# Patient Record
Sex: MALE | Race: BLACK OR AFRICAN AMERICAN | NOT HISPANIC OR LATINO | Employment: OTHER | URBAN - METROPOLITAN AREA
[De-identification: names, ages, dates, MRNs, and addresses within clinical notes are randomized per-mention and may not be internally consistent; named-entity substitution may affect disease eponyms.]

---

## 2017-01-03 RX ORDER — FUROSEMIDE 20 MG/1
TABLET ORAL
Qty: 90 TABLET | Refills: 0 | Status: SHIPPED | OUTPATIENT
Start: 2017-01-03 | End: 2017-02-07 | Stop reason: SDUPTHER

## 2017-01-04 RX ORDER — SPIRONOLACTONE 25 MG/1
TABLET ORAL
Qty: 90 TABLET | Refills: 0 | OUTPATIENT
Start: 2017-01-04

## 2017-01-04 RX ORDER — SPIRONOLACTONE 25 MG/1
TABLET ORAL
Qty: 90 TABLET | Refills: 0 | Status: SHIPPED | OUTPATIENT
Start: 2017-01-04

## 2017-01-04 RX ORDER — CLONIDINE HYDROCHLORIDE 0.2 MG/1
TABLET ORAL
Qty: 270 TABLET | Refills: 0 | Status: ON HOLD | OUTPATIENT
Start: 2017-01-04 | End: 2017-05-12 | Stop reason: HOSPADM

## 2017-01-09 NOTE — TELEPHONE ENCOUNTER
----- Message from Patsy Corrales sent at 1/9/2017  1:06 PM CST -----  Patient is requesting a medication refill.     RX name: LANCETS  Strength:   Quantity:   Directions: patient test himself 2xday    Pharmacy name: [facility]

## 2017-01-10 RX ORDER — LANCETS
EACH MISCELLANEOUS
Qty: 200 EACH | Refills: 3 | Status: ON HOLD | OUTPATIENT
Start: 2017-01-10 | End: 2017-05-12 | Stop reason: HOSPADM

## 2017-02-05 RX ORDER — PREGABALIN 75 MG/1
CAPSULE ORAL
Qty: 90 CAPSULE | Refills: 0 | Status: SHIPPED | OUTPATIENT
Start: 2017-02-05

## 2017-02-07 ENCOUNTER — OFFICE VISIT (OUTPATIENT)
Dept: FAMILY MEDICINE | Facility: CLINIC | Age: 72
End: 2017-02-07
Payer: MEDICARE

## 2017-02-07 VITALS
SYSTOLIC BLOOD PRESSURE: 130 MMHG | OXYGEN SATURATION: 99 % | BODY MASS INDEX: 25.7 KG/M2 | HEIGHT: 69 IN | HEART RATE: 65 BPM | WEIGHT: 173.5 LBS | TEMPERATURE: 98 F | DIASTOLIC BLOOD PRESSURE: 72 MMHG

## 2017-02-07 DIAGNOSIS — N18.4 CKD (CHRONIC KIDNEY DISEASE) STAGE 4, GFR 15-29 ML/MIN: ICD-10-CM

## 2017-02-07 DIAGNOSIS — I25.810 CORONARY ARTERY DISEASE INVOLVING CORONARY BYPASS GRAFT OF NATIVE HEART WITHOUT ANGINA PECTORIS: ICD-10-CM

## 2017-02-07 DIAGNOSIS — E11.42 CONTROLLED TYPE 2 DIABETES MELLITUS WITH DIABETIC POLYNEUROPATHY, WITH LONG-TERM CURRENT USE OF INSULIN: Primary | ICD-10-CM

## 2017-02-07 DIAGNOSIS — E11.21 TYPE 2 DIABETES MELLITUS WITH DIABETIC NEPHROPATHY, WITH LONG-TERM CURRENT USE OF INSULIN: ICD-10-CM

## 2017-02-07 DIAGNOSIS — E11.22 TYPE 2 DIABETES MELLITUS WITH STAGE 4 CHRONIC KIDNEY DISEASE, WITH LONG-TERM CURRENT USE OF INSULIN: ICD-10-CM
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
## 2017-02-07 DIAGNOSIS — Z79.4 CONTROLLED TYPE 2 DIABETES MELLITUS WITH DIABETIC POLYNEUROPATHY, WITH LONG-TERM CURRENT USE OF INSULIN: Primary | ICD-10-CM

## 2017-02-07 DIAGNOSIS — N40.0 BENIGN PROSTATIC HYPERPLASIA, PRESENCE OF LOWER URINARY TRACT SYMPTOMS UNSPECIFIED, UNSPECIFIED MORPHOLOGY: ICD-10-CM

## 2017-02-07 DIAGNOSIS — N18.4 TYPE 2 DIABETES MELLITUS WITH STAGE 4 CHRONIC KIDNEY DISEASE, WITH LONG-TERM CURRENT USE OF INSULIN: ICD-10-CM

## 2017-02-07 DIAGNOSIS — I10 ESSENTIAL HYPERTENSION: ICD-10-CM

## 2017-02-07 DIAGNOSIS — Z79.4 TYPE 2 DIABETES MELLITUS WITH DIABETIC NEPHROPATHY, WITH LONG-TERM CURRENT USE OF INSULIN: ICD-10-CM

## 2017-02-07 DIAGNOSIS — Z79.4 TYPE 2 DIABETES MELLITUS WITH STAGE 4 CHRONIC KIDNEY DISEASE, WITH LONG-TERM CURRENT USE OF INSULIN: ICD-10-CM

## 2017-02-07 DIAGNOSIS — Z91.199 NONCOMPLIANCE: ICD-10-CM

## 2017-02-07 DIAGNOSIS — I73.9 PVD (PERIPHERAL VASCULAR DISEASE): ICD-10-CM

## 2017-02-07 DIAGNOSIS — N19 RENAL FAILURE: ICD-10-CM

## 2017-02-07 DIAGNOSIS — I25.10 CORONARY ARTERY DISEASE INVOLVING NATIVE CORONARY ARTERY WITHOUT ANGINA PECTORIS, UNSPECIFIED WHETHER NATIVE OR TRANSPLANTED HEART: ICD-10-CM

## 2017-02-07 PROCEDURE — 1157F ADVNC CARE PLAN IN RCRD: CPT | Mod: S$GLB,,, | Performed by: FAMILY MEDICINE

## 2017-02-07 PROCEDURE — 2022F DILAT RTA XM EVC RTNOPTHY: CPT | Mod: S$GLB,,, | Performed by: FAMILY MEDICINE

## 2017-02-07 PROCEDURE — 1160F RVW MEDS BY RX/DR IN RCRD: CPT | Mod: S$GLB,,, | Performed by: FAMILY MEDICINE

## 2017-02-07 PROCEDURE — 1159F MED LIST DOCD IN RCRD: CPT | Mod: S$GLB,,, | Performed by: FAMILY MEDICINE

## 2017-02-07 PROCEDURE — 3046F HEMOGLOBIN A1C LEVEL >9.0%: CPT | Mod: S$GLB,,, | Performed by: FAMILY MEDICINE

## 2017-02-07 PROCEDURE — 99213 OFFICE O/P EST LOW 20 MIN: CPT | Mod: S$GLB,,, | Performed by: FAMILY MEDICINE

## 2017-02-07 PROCEDURE — 3060F POS MICROALBUMINURIA REV: CPT | Mod: S$GLB,,, | Performed by: FAMILY MEDICINE

## 2017-02-07 PROCEDURE — 1125F AMNT PAIN NOTED PAIN PRSNT: CPT | Mod: S$GLB,,, | Performed by: FAMILY MEDICINE

## 2017-02-07 PROCEDURE — 99499 UNLISTED E&M SERVICE: CPT | Mod: S$GLB,,, | Performed by: FAMILY MEDICINE

## 2017-02-07 NOTE — MR AVS SNAPSHOT
[facility]  [address] [place] [address]  [place] [address]  Phone: [phone]  Fax: [phone]                  Tommie Salmeron   2017 1:40 PM   Office Visit    Description:  Male : 1945   Provider:  Bridger Deleon MD   Department:  [facility]           Reason for Visit     Follow-up           Diagnoses this Visit        Comments    Controlled type 2 diabetes mellitus with diabetic polyneuropathy, with long-term current use of insulin    -  Primary     Fall, initial encounter         Coronary artery disease involving native coronary artery without angina pectoris, unspecified whether native or transplanted heart         Coronary artery disease involving coronary bypass graft of native heart without angina pectoris         Essential hypertension         Benign prostatic hyperplasia, presence of lower urinary tract symptoms unspecified, unspecified morphology         CKD (chronic kidney disease) stage 4, GFR 15-29 ml/min         Type 2 diabetes mellitus with stage 4 chronic kidney disease, with long-term current use of insulin         Renal failure         Type 2 diabetes mellitus with diabetic nephropathy, with long-term current use of insulin         PVD (peripheral vascular disease)         Noncompliance                To Do List           Goals (5 Years of Data)     None      Follow-Up and Disposition     Return in about 3 months (around 2017).      [facility]     [facility] On Call Nurse Care Line -  Assistance  Registered nurses in the [facility] On Call Center provide clinical advisement, health education, appointment booking, and other advisory services.  Call for this free service at [phone].             Medications           Message regarding Medications     Verify the changes and/or additions to your medication regime listed below are the same as discussed with your clinician today.  If any of these changes or additions are incorrect, please notify  "your healthcare provider.        STOP taking these medications     metoclopramide HCl (REGLAN) 5 MG tablet TAKE ONE TABLET BY MOUTH 4 TIMES DAILY    sodium bicarbonate 650 MG tablet Take 1 tablet (650 mg total) by mouth 3 (three) times daily.    tamsulosin (FLOMAX) 0.4 mg Cp24 Take 1 capsule (0.4 mg total) by mouth once daily.           Verify that the below list of medications is an accurate representation of the medications you are currently taking.  If none reported, the list may be blank. If incorrect, please contact your healthcare provider. Carry this list with you in case of emergency.           Current Medications     aspirin (ECOTRIN) 81 MG EC tablet Take 1 tablet (81 mg total) by mouth once daily.    BLOOD SUGAR DIAGNOSTIC (TRUETEST TEST STRIPS MISC) by Misc.(Non-Drug; Combo Route) route.    cloNIDine (CATAPRES) 0.2 MG tablet TAKE ONE TABLET BY MOUTH THREE TIMES DAILY    escitalopram oxalate (LEXAPRO) 20 MG tablet TAKE ONE TABLET BY MOUTH ONCE DAILY    furosemide (LASIX) 20 MG tablet TAKE ONE TABLET BY MOUTH ONCE DAILY    insulin safety needles, disp, (PRODIGY PEN NEEDLE) 29 x 1/2 " Ndle 1 Units by Misc.(Non-Drug; Combo Route) route 2 (two) times daily. Flex pen needles    lancets Misc USE BID    LYRICA 75 mg capsule TAKE ONE CAPSULE BY MOUTH NIGHTLY    metoprolol tartrate (LOPRESSOR) 100 MG tablet Take 0.5 tablets (50 mg total) by mouth once daily.    NOVOLIN 70/30 100 unit/mL (70-30) injection INJECT 10 UNITS SUB-Q TWICE DAILY    pantoprazole (PROTONIX) 40 MG tablet TAKE ONE TABLET BY MOUTH ONCE DAILY    simvastatin (ZOCOR) 20 MG tablet TAKE ONE TABLET BY MOUTH EVERY EVENING    spironolactone (ALDACTONE) 25 MG tablet TAKE ONE TABLET BY MOUTH ONCE DAILY           Clinical Reference Information           Your Vitals Were     BP Pulse Temp Height Weight SpO2    130/72 65 98.4 °F (36.9 °C) (Oral) 5' 9" (1.753 m) 78.7 kg (173 lb 8 oz) 99%    BMI                25.62 kg/m2          Blood Pressure          Most " Recent Value    BP  130/72      Allergies as of 2/7/2017     No Known Allergies      Immunizations Administered on Date of Encounter - 2/7/2017     None      Orders Placed During Today's Visit     Future Labs/Procedures Expected by Expires    CBC auto differential  2/7/2017 4/8/2018    Comprehensive metabolic panel  2/7/2017 4/8/2018    Hemoglobin A1c  As directed 2/7/2018    Lipid panel  As directed 2/7/2018    Microalbumin/creatinine urine ratio  As directed 2/7/2018    PSA, Screening  As directed 2/7/2018      [facility] Sign-Up     Activating your [facility] account is as easy as 1-2-3!     1) Visit [email or web address], select Sign Up Now, enter this activation code and your date of birth, then select Next.  [email or web address]  Expires: 3/24/2017  2:11 PM      2) Create a username and password to use when you visit [facility] in the future and select a security question in case you lose your password and select Next.    3) Enter your e-mail address and click Sign Up!    Additional Information  If you have questions, please e-mail [email or web address] or call [phone] to talk to our [facility] staff. Remember, [facility] is NOT to be used for urgent needs. For medical emergencies, dial 911.         Smoking Cessation     If you would like to quit smoking:   You may be eligible for free services if you are a [place] resident and started smoking cigarettes before September 1, 1988.  Call the Smoking [employer]essation [employer] ([facility]) toll free at (496) [phone] or ([phone] [phone].   Call 1-800-QUIT-NOW if you do not meet the above criteria.            Language Assistance Services     ATTENTION: Language assistance services are available, free of charge. Please call [phone].      ATENCIÓN: Si habla caitlin, tiene a mahoney disposición servicios gratuitos de asistencia lingüística. Llame al [phone].     CHÚ Ý: N?u b?n nói Ti?ng Vi?t, có các d?ch v? h? tr? ngôn ng? mi?n phí dành cho b?n. G?i s? [phone].         [facility] complies with applicable Federal civil rights laws and does not discriminate on the basis of race, color, national origin, age, disability, or sex.

## 2017-02-07 NOTE — PROGRESS NOTES
Subjective:      Patient ID: Tommie Salmeron is a 72 y.o. male.    Chief Complaint: Follow-up (check-up)    HPI Comments: First visit since May; DM check ed twice a day; good bp and glucose; right leg aches where they took the ligament out , vein for CABG; fell at. At restaurant. Right medial thigh pain; drvies; right eye works; doesn't like getting blood done.    Review of Systems   Constitutional: Negative for appetite change, fatigue, fever and unexpected weight change.   HENT: Negative for congestion, ear pain, sinus pressure and sore throat.    Eyes: Positive for visual disturbance. Negative for pain.   Respiratory: Negative for shortness of breath.    Cardiovascular: Negative for chest pain.   Gastrointestinal: Negative for abdominal pain, constipation and diarrhea.   Endocrine: Negative for polyuria.   Genitourinary: Negative for difficulty urinating and frequency.   Musculoskeletal: Positive for arthralgias and gait problem. Negative for back pain and myalgias.   Skin: Negative for color change.   Allergic/Immunologic: Negative.    Neurological: Negative for syncope, weakness and headaches.   Hematological: Does not bruise/bleed easily.   Psychiatric/Behavioral: Negative for dysphoric mood and suicidal ideas. The patient is not nervous/anxious.    All other systems reviewed and are negative.    Objective:     Physical Exam   Constitutional: He is oriented to person, place, and time. He appears well-developed and well-nourished. No distress.   HENT:   Head: Normocephalic and atraumatic.   Right Ear: External ear normal.   Left Ear: External ear normal.   Mouth/Throat: Oropharynx is clear and moist. No oropharyngeal exudate.   Eyes: Conjunctivae and EOM are normal. Pupils are equal, round, and reactive to light. Right eye exhibits no discharge. Left eye exhibits no discharge. No scleral icterus.   Left eye prostehesis   Neck: Normal range of motion. Neck supple. No JVD present. No tracheal deviation present.  No thyromegaly present.   Cardiovascular: Normal rate and regular rhythm.  Exam reveals no gallop and no friction rub.    No murmur heard.  Pulses:       Dorsalis pedis pulses are 0 on the right side, and 0 on the left side.        Posterior tibial pulses are 0 on the right side, and 0 on the left side.   Pulmonary/Chest: Effort normal and breath sounds normal. No stridor. No respiratory distress. He has no wheezes. He has no rales. He exhibits no tenderness.   Abdominal: Soft. He exhibits no distension and no mass. There is no tenderness. There is no rebound and no guarding.   Musculoskeletal: Normal range of motion. He exhibits no edema or tenderness.        Right foot: There is normal range of motion and no deformity.        Left foot: There is normal range of motion and no deformity.   Feet:   Right Foot:   Protective Sensation: 3 sites tested. 3 sites sensed.   Skin Integrity: Negative for ulcer, blister, skin breakdown, erythema, warmth, callus or dry skin.   Left Foot:   Protective Sensation: 3 sites tested. 3 sites sensed.   Skin Integrity: Negative for ulcer, blister, skin breakdown, erythema, warmth, callus or dry skin.   Lymphadenopathy:     He has no cervical adenopathy.   Neurological: He is alert and oriented to person, place, and time. He has normal reflexes. He displays normal reflexes. No cranial nerve deficit. He exhibits normal muscle tone. Coordination normal.   Skin: Skin is warm and dry. No rash noted. He is not diaphoretic. No erythema. No pallor.   Psychiatric: He has a normal mood and affect. His behavior is normal. Judgment and thought content normal.   Nursing note and vitals reviewed.    Assessment:     1. Controlled type 2 diabetes mellitus with diabetic polyneuropathy, with long-term current use of insulin    2. Fall, initial encounter    3. Coronary artery disease involving native coronary artery without angina pectoris, unspecified whether native or transplanted heart    4. Coronary  artery disease involving coronary bypass graft of native heart without angina pectoris    5. Essential hypertension    6. Benign prostatic hyperplasia, presence of lower urinary tract symptoms unspecified, unspecified morphology    7. CKD (chronic kidney disease) stage 4, GFR 15-29 ml/min    8. Type 2 diabetes mellitus with stage 4 chronic kidney disease, with long-term current use of insulin    9. Renal failure    10. Type 2 diabetes mellitus with diabetic nephropathy, with long-term current use of insulin    11. PVD (peripheral vascular disease)    12. Noncompliance      Plan:     New Prescriptions    No medications on file     Discontinued Medications    FUROSEMIDE (LASIX) 20 MG TABLET    TAKE ONE TABLET BY MOUTH ONCE DAILY    LYRICA 25 MG CAPSULE    TAKE ONE CAPSULE BY MOUTH AT BEDTIME    METOCLOPRAMIDE HCL (REGLAN) 5 MG TABLET    TAKE ONE TABLET BY MOUTH 4 TIMES DAILY    SODIUM BICARBONATE 650 MG TABLET    Take 1 tablet (650 mg total) by mouth 3 (three) times daily.    TAMSULOSIN (FLOMAX) 0.4 MG CP24    Take 1 capsule (0.4 mg total) by mouth once daily.     Modified Medications    No medications on file       Controlled type 2 diabetes mellitus with diabetic polyneuropathy, with long-term current use of insulin  -     CBC auto differential; Future; Expected date: 2/7/17  -     Comprehensive metabolic panel; Future; Expected date: 2/7/17  -     Hemoglobin A1c; Future  -     Lipid panel; Future  -     Microalbumin/creatinine urine ratio; Future  -     PSA, Screening; Future    Fall, initial encounter  -     CBC auto differential; Future; Expected date: 2/7/17  -     Comprehensive metabolic panel; Future; Expected date: 2/7/17  -     Hemoglobin A1c; Future  -     Lipid panel; Future  -     Microalbumin/creatinine urine ratio; Future  -     PSA, Screening; Future    Coronary artery disease involving native coronary artery without angina pectoris, unspecified whether native or transplanted heart  -     CBC auto  differential; Future; Expected date: 2/7/17  -     Comprehensive metabolic panel; Future; Expected date: 2/7/17  -     Hemoglobin A1c; Future  -     Lipid panel; Future  -     Microalbumin/creatinine urine ratio; Future  -     PSA, Screening; Future    Coronary artery disease involving coronary bypass graft of native heart without angina pectoris  -     CBC auto differential; Future; Expected date: 2/7/17  -     Comprehensive metabolic panel; Future; Expected date: 2/7/17  -     Hemoglobin A1c; Future  -     Lipid panel; Future  -     Microalbumin/creatinine urine ratio; Future  -     PSA, Screening; Future    Essential hypertension  -     CBC auto differential; Future; Expected date: 2/7/17  -     Comprehensive metabolic panel; Future; Expected date: 2/7/17  -     Hemoglobin A1c; Future  -     Lipid panel; Future  -     Microalbumin/creatinine urine ratio; Future  -     PSA, Screening; Future    Benign prostatic hyperplasia, presence of lower urinary tract symptoms unspecified, unspecified morphology  -     CBC auto differential; Future; Expected date: 2/7/17  -     Comprehensive metabolic panel; Future; Expected date: 2/7/17  -     Hemoglobin A1c; Future  -     Lipid panel; Future  -     Microalbumin/creatinine urine ratio; Future  -     PSA, Screening; Future    CKD (chronic kidney disease) stage 4, GFR 15-29 ml/min  -     CBC auto differential; Future; Expected date: 2/7/17  -     Comprehensive metabolic panel; Future; Expected date: 2/7/17  -     Hemoglobin A1c; Future  -     Lipid panel; Future  -     Microalbumin/creatinine urine ratio; Future  -     PSA, Screening; Future    Type 2 diabetes mellitus with stage 4 chronic kidney disease, with long-term current use of insulin  -     CBC auto differential; Future; Expected date: 2/7/17  -     Comprehensive metabolic panel; Future; Expected date: 2/7/17  -     Hemoglobin A1c; Future  -     Lipid panel; Future  -     Microalbumin/creatinine urine ratio; Future  -      PSA, Screening; Future    Renal failure  -     CBC auto differential; Future; Expected date: 2/7/17  -     Comprehensive metabolic panel; Future; Expected date: 2/7/17  -     Hemoglobin A1c; Future  -     Lipid panel; Future  -     Microalbumin/creatinine urine ratio; Future  -     PSA, Screening; Future    Type 2 diabetes mellitus with diabetic nephropathy, with long-term current use of insulin  -     CBC auto differential; Future; Expected date: 2/7/17  -     Comprehensive metabolic panel; Future; Expected date: 2/7/17  -     Hemoglobin A1c; Future  -     Lipid panel; Future  -     Microalbumin/creatinine urine ratio; Future  -     PSA, Screening; Future    PVD (peripheral vascular disease)  -     CBC auto differential; Future; Expected date: 2/7/17  -     Comprehensive metabolic panel; Future; Expected date: 2/7/17  -     Hemoglobin A1c; Future  -     Lipid panel; Future  -     Microalbumin/creatinine urine ratio; Future  -     PSA, Screening; Future    Noncompliance  -     CBC auto differential; Future; Expected date: 2/7/17  -     Comprehensive metabolic panel; Future; Expected date: 2/7/17  -     Hemoglobin A1c; Future  -     Lipid panel; Future  -     Microalbumin/creatinine urine ratio; Future  -     PSA, Screening; Future

## 2017-05-08 ENCOUNTER — HOSPITAL ENCOUNTER (INPATIENT)
Facility: HOSPITAL | Age: 72
LOS: 5 days | Discharge: HOSPICE | DRG: 683 | End: 2017-05-13
Attending: EMERGENCY MEDICINE | Admitting: INTERNAL MEDICINE
Payer: MEDICARE

## 2017-05-08 DIAGNOSIS — E87.70 VOLUME OVERLOAD: ICD-10-CM

## 2017-05-08 DIAGNOSIS — Z51.5 END OF LIFE CARE: ICD-10-CM

## 2017-05-08 DIAGNOSIS — I73.9 PVD (PERIPHERAL VASCULAR DISEASE): ICD-10-CM

## 2017-05-08 DIAGNOSIS — I25.10 CORONARY ARTERY DISEASE INVOLVING NATIVE CORONARY ARTERY WITHOUT ANGINA PECTORIS, UNSPECIFIED WHETHER NATIVE OR TRANSPLANTED HEART: ICD-10-CM

## 2017-05-08 DIAGNOSIS — N40.0 BENIGN PROSTATIC HYPERPLASIA, PRESENCE OF LOWER URINARY TRACT SYMPTOMS UNSPECIFIED, UNSPECIFIED MORPHOLOGY: ICD-10-CM

## 2017-05-08 DIAGNOSIS — Z71.89 GOALS OF CARE, COUNSELING/DISCUSSION: ICD-10-CM

## 2017-05-08 DIAGNOSIS — N18.9 RENAL FAILURE (ARF), ACUTE ON CHRONIC: Primary | ICD-10-CM

## 2017-05-08 DIAGNOSIS — Z95.1 S/P CABG (CORONARY ARTERY BYPASS GRAFT): ICD-10-CM

## 2017-05-08 DIAGNOSIS — I10 ESSENTIAL HYPERTENSION: ICD-10-CM

## 2017-05-08 DIAGNOSIS — Z45.2 ENCOUNTER FOR CENTRAL LINE PLACEMENT: ICD-10-CM

## 2017-05-08 DIAGNOSIS — Z71.89 ENCOUNTER FOR HOSPICE CARE DISCUSSION: ICD-10-CM

## 2017-05-08 DIAGNOSIS — N17.9 RENAL FAILURE (ARF), ACUTE ON CHRONIC: Primary | ICD-10-CM

## 2017-05-08 DIAGNOSIS — E87.5 ACUTE HYPERKALEMIA: ICD-10-CM

## 2017-05-08 DIAGNOSIS — E83.51 HYPOCALCEMIA: ICD-10-CM

## 2017-05-08 DIAGNOSIS — N19 RENAL FAILURE: ICD-10-CM

## 2017-05-08 DIAGNOSIS — E87.20 METABOLIC ACIDOSIS: ICD-10-CM

## 2017-05-08 LAB
ALBUMIN SERPL BCP-MCNC: 3 G/DL
ALBUMIN SERPL BCP-MCNC: 3 G/DL
ALLENS TEST: ABNORMAL
ALP SERPL-CCNC: 133 U/L
ALP SERPL-CCNC: 137 U/L
ALT SERPL W/O P-5'-P-CCNC: 15 U/L
ALT SERPL W/O P-5'-P-CCNC: 15 U/L
ANION GAP SERPL CALC-SCNC: 18 MMOL/L
ANION GAP SERPL CALC-SCNC: 20 MMOL/L
ANISOCYTOSIS BLD QL SMEAR: SLIGHT
APTT BLDCRRT: 31.9 SEC
AST SERPL-CCNC: 32 U/L
AST SERPL-CCNC: 36 U/L
BACTERIA #/AREA URNS HPF: ABNORMAL /HPF
BASOPHILS # BLD AUTO: 0.02 K/UL
BASOPHILS NFR BLD: 0.3 %
BILIRUB SERPL-MCNC: 0.5 MG/DL
BILIRUB SERPL-MCNC: 0.6 MG/DL
BILIRUB UR QL STRIP: NEGATIVE
BUN SERPL-MCNC: 113 MG/DL
BUN SERPL-MCNC: 115 MG/DL
CALCIUM SERPL-MCNC: 4.6 MG/DL
CALCIUM SERPL-MCNC: 4.8 MG/DL
CHLORIDE SERPL-SCNC: 107 MMOL/L
CHLORIDE SERPL-SCNC: 107 MMOL/L
CHOLEST/HDLC SERPL: 5.5 {RATIO}
CK SERPL-CCNC: 1560 U/L
CLARITY UR: ABNORMAL
CO2 SERPL-SCNC: 12 MMOL/L
CO2 SERPL-SCNC: 9 MMOL/L
COLOR UR: YELLOW
CREAT SERPL-MCNC: 18.3 MG/DL
CREAT SERPL-MCNC: 18.5 MG/DL
CREAT UR-MCNC: 172.3 MG/DL
DACRYOCYTES BLD QL SMEAR: ABNORMAL
DELSYS: ABNORMAL
DELSYS: ABNORMAL
DIFFERENTIAL METHOD: ABNORMAL
EOSINOPHIL # BLD AUTO: 0 K/UL
EOSINOPHIL NFR BLD: 0.4 %
ERYTHROCYTE [DISTWIDTH] IN BLOOD BY AUTOMATED COUNT: 15.2 %
EST. GFR  (AFRICAN AMERICAN): 3 ML/MIN/1.73 M^2
EST. GFR  (AFRICAN AMERICAN): 3 ML/MIN/1.73 M^2
EST. GFR  (NON AFRICAN AMERICAN): 2 ML/MIN/1.73 M^2
EST. GFR  (NON AFRICAN AMERICAN): 2 ML/MIN/1.73 M^2
GLUCOSE SERPL-MCNC: 76 MG/DL
GLUCOSE SERPL-MCNC: 97 MG/DL
GLUCOSE UR QL STRIP: NEGATIVE
HCO3 UR-SCNC: 12.5 MMOL/L (ref 24–28)
HCO3 UR-SCNC: 13.4 MMOL/L (ref 24–28)
HCO3 UR-SCNC: 18.9 MMOL/L (ref 24–28)
HCT VFR BLD AUTO: 22.8 %
HDL/CHOLESTEROL RATIO: 18.3 %
HDLC SERPL-MCNC: 17 MG/DL
HDLC SERPL-MCNC: 93 MG/DL
HGB BLD-MCNC: 7.8 G/DL
HGB UR QL STRIP: ABNORMAL
HYALINE CASTS #/AREA URNS LPF: 0 /LPF
HYPOCHROMIA BLD QL SMEAR: ABNORMAL
INR PPP: 1.5
KETONES UR QL STRIP: NEGATIVE
LACTATE SERPL-SCNC: 0.6 MMOL/L
LDLC SERPL CALC-MCNC: 51.8 MG/DL
LEUKOCYTE ESTERASE UR QL STRIP: NEGATIVE
LYMPHOCYTES # BLD AUTO: 1 K/UL
LYMPHOCYTES NFR BLD: 14.4 %
MAGNESIUM SERPL-MCNC: 1.6 MG/DL
MCH RBC QN AUTO: 28.6 PG
MCHC RBC AUTO-ENTMCNC: 34.2 %
MCV RBC AUTO: 84 FL
MICROSCOPIC COMMENT: ABNORMAL
MONOCYTES # BLD AUTO: 1.1 K/UL
MONOCYTES NFR BLD: 15.2 %
NEUTROPHILS # BLD AUTO: 5.1 K/UL
NEUTROPHILS NFR BLD: 69.7 %
NITRITE UR QL STRIP: NEGATIVE
NONHDLC SERPL-MCNC: 76 MG/DL
OVALOCYTES BLD QL SMEAR: ABNORMAL
PCO2 BLDA: 35.7 MMHG (ref 35–45)
PCO2 BLDA: 36.5 MMHG (ref 35–45)
PCO2 BLDA: 40.6 MMHG (ref 35–45)
PH SMN: 7.13 [PH] (ref 7.35–7.45)
PH SMN: 7.15 [PH] (ref 7.35–7.45)
PH SMN: 7.32 [PH] (ref 7.35–7.45)
PH UR STRIP: 5 [PH] (ref 5–8)
PHOSPHATE SERPL-MCNC: 11.5 MG/DL
PLATELET # BLD AUTO: 105 K/UL
PLATELET BLD QL SMEAR: ABNORMAL
PMV BLD AUTO: 12.4 FL
PO2 BLDA: 34 MMHG (ref 80–100)
PO2 BLDA: 47 MMHG (ref 80–100)
PO2 BLDA: 81 MMHG (ref 80–100)
POC BE: -16 MMOL/L
POC BE: -16 MMOL/L
POC BE: -7 MMOL/L
POC SATURATED O2: 49 % (ref 95–100)
POC SATURATED O2: 79 % (ref 95–100)
POC SATURATED O2: 92 % (ref 95–100)
POC TCO2: 14 MMOL/L (ref 23–27)
POC TCO2: 15 MMOL/L (ref 23–27)
POC TCO2: 20 MMOL/L (ref 23–27)
POCT GLUCOSE: 86 MG/DL (ref 70–110)
POCT GLUCOSE: 95 MG/DL (ref 70–110)
POIKILOCYTOSIS BLD QL SMEAR: ABNORMAL
POLYCHROMASIA BLD QL SMEAR: ABNORMAL
POTASSIUM SERPL-SCNC: 5.5 MMOL/L
POTASSIUM SERPL-SCNC: 5.6 MMOL/L
PROT SERPL-MCNC: 8 G/DL
PROT SERPL-MCNC: 8.5 G/DL
PROT UR QL STRIP: ABNORMAL
PROTHROMBIN TIME: 16 SEC
RBC # BLD AUTO: 2.73 M/UL
RBC #/AREA URNS HPF: 20 /HPF (ref 0–4)
SAMPLE: ABNORMAL
SITE: ABNORMAL
SODIUM SERPL-SCNC: 136 MMOL/L
SODIUM SERPL-SCNC: 137 MMOL/L
SODIUM UR-SCNC: <20 MMOL/L
SP GR UR STRIP: >=1.03 (ref 1–1.03)
SQUAMOUS #/AREA URNS HPF: ABNORMAL /HPF
TARGETS BLD QL SMEAR: ABNORMAL
TRIGL SERPL-MCNC: 121 MG/DL
URN SPEC COLLECT METH UR: ABNORMAL
UROBILINOGEN UR STRIP-ACNC: NEGATIVE EU/DL
UUN UR-MCNC: 377 MG/DL
WBC # BLD AUTO: 7.24 K/UL
WBC #/AREA URNS HPF: 5 /HPF (ref 0–5)

## 2017-05-08 PROCEDURE — 84100 ASSAY OF PHOSPHORUS: CPT

## 2017-05-08 PROCEDURE — 85730 THROMBOPLASTIN TIME PARTIAL: CPT

## 2017-05-08 PROCEDURE — 36415 COLL VENOUS BLD VENIPUNCTURE: CPT

## 2017-05-08 PROCEDURE — 25000003 PHARM REV CODE 250: Performed by: INTERNAL MEDICINE

## 2017-05-08 PROCEDURE — 36600 WITHDRAWAL OF ARTERIAL BLOOD: CPT

## 2017-05-08 PROCEDURE — 20000000 HC ICU ROOM

## 2017-05-08 PROCEDURE — 82550 ASSAY OF CK (CPK): CPT

## 2017-05-08 PROCEDURE — 83605 ASSAY OF LACTIC ACID: CPT

## 2017-05-08 PROCEDURE — 86706 HEP B SURFACE ANTIBODY: CPT

## 2017-05-08 PROCEDURE — 81000 URINALYSIS NONAUTO W/SCOPE: CPT

## 2017-05-08 PROCEDURE — 87086 URINE CULTURE/COLONY COUNT: CPT

## 2017-05-08 PROCEDURE — 85025 COMPLETE CBC W/AUTO DIFF WBC: CPT

## 2017-05-08 PROCEDURE — 99285 EMERGENCY DEPT VISIT HI MDM: CPT | Mod: 25

## 2017-05-08 PROCEDURE — 83735 ASSAY OF MAGNESIUM: CPT

## 2017-05-08 PROCEDURE — 82803 BLOOD GASES ANY COMBINATION: CPT

## 2017-05-08 PROCEDURE — 84540 ASSAY OF URINE/UREA-N: CPT

## 2017-05-08 PROCEDURE — 87340 HEPATITIS B SURFACE AG IA: CPT

## 2017-05-08 PROCEDURE — 80100016 HC MAINTENANCE HEMODIALYSIS

## 2017-05-08 PROCEDURE — 82570 ASSAY OF URINE CREATININE: CPT

## 2017-05-08 PROCEDURE — 82962 GLUCOSE BLOOD TEST: CPT

## 2017-05-08 PROCEDURE — 80053 COMPREHEN METABOLIC PANEL: CPT

## 2017-05-08 PROCEDURE — 93005 ELECTROCARDIOGRAM TRACING: CPT

## 2017-05-08 PROCEDURE — 80053 COMPREHEN METABOLIC PANEL: CPT | Mod: 91

## 2017-05-08 PROCEDURE — 80074 ACUTE HEPATITIS PANEL: CPT

## 2017-05-08 PROCEDURE — 85610 PROTHROMBIN TIME: CPT

## 2017-05-08 PROCEDURE — 84300 ASSAY OF URINE SODIUM: CPT

## 2017-05-08 PROCEDURE — 80061 LIPID PANEL: CPT

## 2017-05-08 RX ORDER — METOPROLOL TARTRATE 50 MG/1
100 TABLET ORAL 2 TIMES DAILY
Status: DISCONTINUED | OUTPATIENT
Start: 2017-05-08 | End: 2017-05-12

## 2017-05-08 RX ORDER — SODIUM CHLORIDE 9 MG/ML
INJECTION, SOLUTION INTRAVENOUS ONCE
Status: COMPLETED | OUTPATIENT
Start: 2017-05-08 | End: 2017-05-09

## 2017-05-08 RX ORDER — ASPIRIN 81 MG/1
81 TABLET ORAL DAILY
Status: DISCONTINUED | OUTPATIENT
Start: 2017-05-09 | End: 2017-05-12

## 2017-05-08 RX ORDER — SEVELAMER CARBONATE 800 MG/1
800 TABLET, FILM COATED ORAL
Status: DISCONTINUED | OUTPATIENT
Start: 2017-05-09 | End: 2017-05-11

## 2017-05-08 RX ORDER — HEPARIN SODIUM 5000 [USP'U]/ML
5000 INJECTION, SOLUTION INTRAVENOUS; SUBCUTANEOUS EVERY 8 HOURS
Status: DISCONTINUED | OUTPATIENT
Start: 2017-05-08 | End: 2017-05-12

## 2017-05-08 RX ORDER — SPIRONOLACTONE 25 MG/1
25 TABLET ORAL DAILY
Status: DISCONTINUED | OUTPATIENT
Start: 2017-05-09 | End: 2017-05-08

## 2017-05-08 RX ORDER — SODIUM CITRATE AND CITRIC ACID MONOHYDRATE 334; 500 MG/5ML; MG/5ML
45 SOLUTION ORAL ONCE
Status: COMPLETED | OUTPATIENT
Start: 2017-05-08 | End: 2017-05-08

## 2017-05-08 RX ORDER — IBUPROFEN 200 MG
24 TABLET ORAL
Status: DISCONTINUED | OUTPATIENT
Start: 2017-05-08 | End: 2017-05-12

## 2017-05-08 RX ORDER — SODIUM CHLORIDE 9 MG/ML
INJECTION, SOLUTION INTRAVENOUS
Status: DISCONTINUED | OUTPATIENT
Start: 2017-05-08 | End: 2017-05-12

## 2017-05-08 RX ORDER — HEPARIN SODIUM 1000 [USP'U]/ML
2500 INJECTION, SOLUTION INTRAVENOUS; SUBCUTANEOUS
Status: DISCONTINUED | OUTPATIENT
Start: 2017-05-08 | End: 2017-05-10

## 2017-05-08 RX ORDER — SODIUM CHLORIDE 0.9 % (FLUSH) 0.9 %
3 SYRINGE (ML) INJECTION EVERY 8 HOURS
Status: DISCONTINUED | OUTPATIENT
Start: 2017-05-08 | End: 2017-05-12

## 2017-05-08 RX ORDER — SODIUM CITRATE AND CITRIC ACID MONOHYDRATE 334; 500 MG/5ML; MG/5ML
30 SOLUTION ORAL 3 TIMES DAILY
Status: DISCONTINUED | OUTPATIENT
Start: 2017-05-08 | End: 2017-05-09

## 2017-05-08 RX ORDER — INSULIN ASPART 100 [IU]/ML
10 INJECTION, SUSPENSION SUBCUTANEOUS 2 TIMES DAILY WITH MEALS
Status: DISCONTINUED | OUTPATIENT
Start: 2017-05-09 | End: 2017-05-08

## 2017-05-08 RX ORDER — IBUPROFEN 200 MG
16 TABLET ORAL
Status: DISCONTINUED | OUTPATIENT
Start: 2017-05-08 | End: 2017-05-12

## 2017-05-08 RX ORDER — CLONIDINE HYDROCHLORIDE 0.2 MG/1
0.2 TABLET ORAL 3 TIMES DAILY
Status: DISCONTINUED | OUTPATIENT
Start: 2017-05-08 | End: 2017-05-09

## 2017-05-08 RX ORDER — INSULIN ASPART 100 [IU]/ML
0-5 INJECTION, SOLUTION INTRAVENOUS; SUBCUTANEOUS
Status: DISCONTINUED | OUTPATIENT
Start: 2017-05-08 | End: 2017-05-12

## 2017-05-08 RX ORDER — ESCITALOPRAM OXALATE 20 MG/1
20 TABLET ORAL DAILY
Status: DISCONTINUED | OUTPATIENT
Start: 2017-05-09 | End: 2017-05-13 | Stop reason: HOSPADM

## 2017-05-08 RX ORDER — FUROSEMIDE 20 MG/1
20 TABLET ORAL DAILY
Status: DISCONTINUED | OUTPATIENT
Start: 2017-05-09 | End: 2017-05-08

## 2017-05-08 RX ORDER — SIMVASTATIN 20 MG/1
20 TABLET, FILM COATED ORAL NIGHTLY
Status: DISCONTINUED | OUTPATIENT
Start: 2017-05-08 | End: 2017-05-12

## 2017-05-08 RX ORDER — GLUCAGON 1 MG
1 KIT INJECTION
Status: DISCONTINUED | OUTPATIENT
Start: 2017-05-08 | End: 2017-05-12

## 2017-05-08 RX ADMIN — SODIUM CHLORIDE, PRESERVATIVE FREE 3 ML: 5 INJECTION INTRAVENOUS at 09:05

## 2017-05-08 RX ADMIN — SODIUM CITRATE AND CITRIC ACID MONOHYDRATE 45 ML: 500; 334 SOLUTION ORAL at 09:05

## 2017-05-08 RX ADMIN — SIMVASTATIN 20 MG: 20 TABLET, FILM COATED ORAL at 09:05

## 2017-05-08 RX ADMIN — SODIUM CHLORIDE: 0.9 INJECTION, SOLUTION INTRAVENOUS at 09:05

## 2017-05-08 RX ADMIN — METOPROLOL TARTRATE 100 MG: 50 TABLET ORAL at 09:05

## 2017-05-08 RX ADMIN — HEPARIN SODIUM 1000 UNITS: 1000 INJECTION, SOLUTION INTRAVENOUS; SUBCUTANEOUS at 10:05

## 2017-05-08 RX ADMIN — HEPARIN SODIUM 5000 UNITS: 5000 INJECTION, SOLUTION INTRAVENOUS; SUBCUTANEOUS at 09:05

## 2017-05-08 RX ADMIN — CLONIDINE HYDROCHLORIDE 0.2 MG: 0.2 TABLET ORAL at 09:05

## 2017-05-08 RX ADMIN — CALCIUM GLUCONATE 1000 MG: 94 INJECTION, SOLUTION INTRAVENOUS at 09:05

## 2017-05-08 NOTE — H&P
"[facility] Internal Medicine History and Physical - Resident Note    Admitting Team: [facility] Internal Medicine Team A  Attending Physician: Dr. Frances  Resident: Craig  Interns: Juan Pablo/Tip    Date of Admit: 5/8/2017    Chief Complaint and Duration     "fell down" x >2 hours    Subjective:      History of Present Illness:  Tommie Salmeron is a 72 y.o. male who  has a past medical history of Blind left eye; BPH (benign prostatic hyperplasia); CHF (congestive heart failure); COPD (chronic obstructive pulmonary disease); Diabetes mellitus type I; Hyperlipidemia; Hypertension; Neuropathy; Prosthetic eye globe; and PVD (peripheral vascular disease). The patient presented to [facility] on 5/8/2017 with a primary complaint of Fall (fall last night, swelling around left eye, lives in assisted living center alone. not eating well, and not taking medication appropriately, CBG was 89, then 68, EMS gave oral glucose and D5,now over 100)    Patient is 73 yo man with medical history of L blind eye with prostehtic eye, BPH, HFrEF/HFpEF, HLD, DM, HLD, HTN, PAD s/p PTA, CKDIV who presented to the ED with chief complaint of fall for >2 hours. In his USOH, patient takes care of himself, ambulates with walker and lives at assisted living facility. For the past week, daughter reports that patient has not been eating well, becoming more sleepy and physically weak. Yesterday, patient reports that his son found him down in assisted living after he fell and reported to be on ground for >2 hours. Family called EMS, glucose was in 30's and received D50. Patient refused to come to ED. Today, patient continues to feel weak and called EMS again, glucose was in 80s then 68, givein D5 with improvement. Patient was brought to ED for further eval. Denies SOB, CP, palpitations, dysuria, diarrhea/abdominal pain, fevers, chills, N/V, lightheadedness/headaches.     Medical History:  Past Medical History:   Diagnosis Date    Blind left eye  " "   BPH (benign prostatic hyperplasia)     CHF (congestive heart failure)     COPD (chronic obstructive pulmonary disease)     Diabetes mellitus type I     Hyperlipidemia     Hypertension     Neuropathy     Prosthetic eye globe     PVD (peripheral vascular disease)        Past Surgical History:  Past Surgical History:   Procedure Laterality Date    COLONOSCOPY  01/01/2009    CORONARY ARTERY BYPASS GRAFT      fem/popl revasc w/stent 2011         Allergies:  Review of patient's allergies indicates:  No Known Allergies    Home Medications:  Prior to Admission medications    Medication Sig Start Date End Date Taking? Authorizing Provider   aspirin (ECOTRIN) 81 MG EC tablet Take 1 tablet (81 mg total) by mouth once daily. 4/7/15   Bridger Deleon MD   BLOOD SUGAR DIAGNOSTIC (TRUETEST TEST STRIPS MISC) by Misc.(Non-Drug; Combo Route) route.    Historical Provider, MD   cloNIDine (CATAPRES) 0.2 MG tablet TAKE ONE TABLET BY MOUTH THREE TIMES DAILY 1/4/17   Bridger Deleon MD   escitalopram oxalate (LEXAPRO) 20 MG tablet TAKE ONE TABLET BY MOUTH ONCE DAILY 5/20/15   Bridger Deleon MD   furosemide (LASIX) 20 MG tablet TAKE ONE TABLET BY MOUTH ONCE DAILY 10/23/16   Elmer Lafleur MD   insulin safety needles, disp, (PRODIGY PEN NEEDLE) 29 x 1/2 " Ndle 1 Units by Misc.(Non-Drug; Combo Route) route 2 (two) times daily. Flex pen needles 5/26/15   Bridger Deleon MD   lancets [facility] [place] 1/10/17   Bridger Deleon MD   LYRICA 75 mg capsule TAKE ONE CAPSULE BY MOUTH NIGHTLY 2/5/17   Bridger Deleon MD   metoprolol tartrate (LOPRESSOR) 100 MG tablet Take 0.5 tablets (50 mg total) by mouth once daily. 4/25/16   Bridger Deleon MD   NOVOLIN 70/30 100 unit/mL (70-30) injection INJECT 10 UNITS SUB-Q TWICE DAILY 5/26/16   Bridger Deleon MD   pantoprazole (PROTONIX) 40 MG tablet TAKE ONE TABLET BY MOUTH ONCE DAILY 3/16/16   Bridger Deleon MD   simvastatin (ZOCOR) 20 MG tablet TAKE ONE TABLET BY MOUTH EVERY EVENING " 3/16/16   Bridger Deleon MD   spironolactone (ALDACTONE) 25 MG tablet TAKE ONE TABLET BY MOUTH ONCE DAILY 17   Bridger Deleon MD       Family History:  Family History   Problem Relation Age of Onset    Family history unknown: Yes       Social History:  Social History   Substance Use Topics    Smoking status: Current Every Day Smoker    Smokeless tobacco: None    Alcohol use No       Review of Systems:  Pertinent items are noted in HPI.    Health Maintaince:   Primary Care Physician: Bridger Deleon MD  Immunizations:   Currently on File:   Most Recent Immunizations   Administered Date(s) Administered    PPD Test 2014    Pneumococcal Conjugate - 13 Valent 2016     TDap is not up to date.  Influenza is not up to date.  Pneumovax is up to date.  Cancer Screening:  Colonoscopy: is not up to date.    Objective:     Last 24 Hour Vital Signs:  BP  Min: 124/107  Max: 167/88  Temp  Av.6 °F (36.4 °C)  Min: 97.6 °F (36.4 °C)  Max: 97.6 °F (36.4 °C)  Pulse  Av.2  Min: 67  Max: 70  Resp  Av  Min: 12  Max: 12  SpO2  Av.3 %  Min: 98 %  Max: 100 %  There is no height or weight on file to calculate BMI.       Physical Examination:  Triage Vitals:  ED Triage Vitals   Enc Vitals Group      BP 17 1450 161/93      Pulse 17 1420 68      Resp 17 1412 12      Temp 17 1412 97.6 °F (36.4 °C)      Temp src 17 1412 Oral      SpO2 17 1450 100 %      Weight --       Height --       Head Cir --       Peak Flow --       Pain Score --       Pain Loc --       Pain Edu? --       Excl. in GC? --        Admit Vitals:  BP (!) 167/88  Pulse 68  Temp 97.6 °F (36.4 °C) (Oral)   Resp 12  SpO2 100%    General: alert, cooperative, and no acute distress  Head: Normocephalic, without obvious abnormality, atraumatic  Eyes: conjunctivae/corneas clear; PERRLA, EOM's intact, L eye blind s/p prosthesis  Nose: Nares normal; septum midline; mucosa normal; no drainage or sinus  tenderness  Throat: lips, mucosa, and tongue normal; teeth and gums normal  Neck: no adenopathy,JVP ~[id number], supple, symmetrical, trachea midline and thyroid not enlarged, symmetric, no tenderness/mass/nodules  Lung: clear to auscultation throughout; no increased work of breathing  Heart: regular rate and rhythm, no M/R/G, normal S1/S2.  Abdomen: soft, non-tender; bowel sounds normal; no masses, no organomegaly  Extremities: +edema to mid-knee, no cyanosis   Pulses: 2+ and symmetric  Skin: Skin color, texture, turgor normal; no rashes or lesions  Neuro: A&Ox3, 5/5 motor strength to UE, 3/5 to LE, normal sensation light touch b/l       Laboratory:  Most Recent Data:  CBC:  Lab Results   Component Value Date    WBC 7.24 05/08/2017    RBC 2.73 (L) 05/08/2017    HGB 7.8 (L) 05/08/2017    HCT 22.8 (L) 05/08/2017    MCV 84 05/08/2017    MCH 28.6 05/08/2017    MCHC 34.2 05/08/2017    RDW 15.2 (H) 05/08/2017     (L) 05/08/2017    MPV 12.4 05/08/2017    GRAN 5.1 05/08/2017    GRAN 69.7 05/08/2017    LYMPH 1.0 05/08/2017    LYMPH 14.4 (L) 05/08/2017    MONO 1.1 (H) 05/08/2017    MONO 15.2 (H) 05/08/2017    EOS 0.0 05/08/2017    BASO 0.02 05/08/2017    EOSINOPHIL 0.4 05/08/2017    BASOPHIL 0.3 05/08/2017     Oval/aniso/Poik/Target/Tear Drop additional cells seen    BMP: Lab Results   Component Value Date     05/08/2017    K 5.6 (H) 05/08/2017     05/08/2017    CO2 9 (LL) 05/08/2017     (H) 05/08/2017    CREATININE 18.3 (H) 05/08/2017    GLU 97 05/08/2017    CALCIUM 4.8 (LL) 05/08/2017    MG 1.6 05/08/2017    PHOS 11.5 (HH) 05/08/2017     LFTs: Lab Results   Component Value Date    PROT 8.5 (H) 05/08/2017    ALBUMIN 3.0 (L) 05/08/2017    BILITOT 0.5 05/08/2017    AST 36 05/08/2017    ALKPHOS 137 (H) 05/08/2017    ALT 15 05/08/2017     Coags:   Lab Results   Component Value Date    INR 1.5 (H) 05/08/2017     FLP: Lab Results   Component Value Date    CHOL 145 05/16/2012    HDL 31 (L) 05/16/2012     LDLCALC 95.8 05/16/2012    TRIG 91 05/16/2012    CHOLHDL 21.4 05/16/2012     DM: Lab Results   Component Value Date    HGBA1C 8.7 (H) 05/07/2015    HGBA1C 8.9 (H) 02/18/2015    LDLCALC 95.8 05/16/2012    CREATININE 18.3 (H) 05/08/2017     Thyroid: Lab Results   Component Value Date    TSH 1.141 05/07/2015     Anemia: No results found for: IRON, TIBC, FERRITIN, BLEZAQSX32, FOLATE  Cardiac: Lab Results   Component Value Date    BNP >4900 (H) 05/10/2015     Urinalysis: Lab Results   Component Value Date    LABURIN No significant growth 05/10/2015    COLORU Yellow 05/08/2017    SPECGRAV >=1.030 (A) 05/08/2017    NITRITE Negative 05/08/2017    KETONESU Negative 05/08/2017    UROBILINOGEN Negative 05/08/2017    WBCUA 5 05/08/2017       Trended Lab Data:    Recent Labs  Lab 05/08/17  1413   WBC 7.24   HGB 7.8*   HCT 22.8*   *   MCV 84   RDW 15.2*      K 5.6*      CO2 9*   *   CREATININE 18.3*   GLU 97   PROT 8.5*   ALBUMIN 3.0*   BILITOT 0.5   AST 36   ALKPHOS 137*   ALT 15       Trended Cardiac Data:  No results for input(s): TROPONINI, CKTOTAL, CKMB, BNP in the last 168 hours.    Microbiology Data:  Microbiology Results (last 7 days)     Procedure Component Value Units Date/Time    Urine culture [[id number]] Collected:  05/08/17 1507    Order Status:  No result Specimen:  Urine Updated:  05/08/17 1836    Urine culture [[id number]]     Order Status:  Completed Specimen:  Urine from Urine, Clean Catch     Urine culture [[id number]]     Order Status:  Canceled Specimen:  Urine           Other Laboratory Data:      Other Results:  EKG (my interpretation): NSR, RBBB, fasicular block     Radiology:  Imaging Results         X-Ray Chest 1 View (In process)            Assessment:     Tommie Salmeron is a 72 y.o. male with:    Plan:     Acute Renal Failure in setting of CKDIV  - follows Dr. Phillip  oliguric  has discussion for dialysis with nephrologist but refuses starting HD  - presented with K 5.6,  BUN/Cr 113/18.3, Phos 11.5  baseline Cr 5-7  - EKG NSR, LAE, RBBB, LVH, L anterior fasicular block   - continue sevelamer  consulted L-nephro for emergent HD    AGMA  - AG 20 on admit  likely 2/2 to ARF  - as above    Hyperkalemia/Hyperphosphatemia/Hypocalcemia   - K 5.6, Phos 11.5, Ca 4.8 on admit  likely 2/2 ARF  - continue sevelamer/citrate  consulted for HD in ED    Hypoglycemia  - 2 episodes of BG in 30's and received D50, then BG 65 the next day  - BG  on this admission  holding home meds Novolin 10U BID given hypoglycemia  - continue SSI and accuchecks    Fall  - 1 episode of fall at assisted living  per family, >2hours LOC yesterday  - possibly related to hypoglycemia   - pending CK, ECHO, CT head    Asymptomatic Bacteruria/Hematuria   - UA with RBC 20, WBC 5, mod bacteria  -  Will hold on abx for now  pending UCx    CAD s/p CABG  - continue ASA, simvastatin, Lopressor    Chronic Systolic and Diastolic Heart Failure  - 5/2015 ECHO with 40-45%, DD, mild MR/TR, MARILYN, possible restrictive CM  - CXR Overall grossly stable chronic findings noting possible minimal interstitial edema/congestion without convincing large focal consolidation  - pending repeat ECHO    Uncontrolled DMII  - 5/2015 A1C 8.7  pending repeat  - Home meds NPH 10U BID  will hold home insulin regime due to episodes of hypoglycemia  - SSI and accuchecks for now and adjust as needed    Normocytic Anemia/Thrombocytopenia  - H/H 9.2/26 on admit  baseline Hb 9-10  Plt 105, chronically low in past  - no signs of bleeding  pending Fe study    HTN  - continue clonidine, lopressor    HLD  - 2012 lipid panel WNL  pending repeat  - continue simvastatin    PAD s/p PTA/stent  - 2/2012 US LE Arterial with R mid SFA, regions in popliteal artery/anterior tibial, L SFA  -  Continue ASA  counseled patient on smoking cessation    F:   E:   N: NPO  PPx: heparin   Dispo: pending HD and improvement     Code Status: DNR    Luis A Almeida  [employer] Internal  Medicine HO-I    [facility] Medicine Hospitalist Pager Numbers:   [facility] Hospitalist Medicine Team A (Juan Daniel/Tobias): [phone]  [facility] Hospitalist Medicine Team B (Arnie/Michelle):  [phone]

## 2017-05-08 NOTE — ED TRIAGE NOTES
Patient brought into ED by EMS. Son is at bedside. Son states his dad has been feeling weak over the past few days. Reports EMS was called last night because son found pt on the floor.  He was not brought to the ER. BS today was 69. Patient states his in not in any pain. Was able to walk with assistance to get into bed. Gait was unsteady. Patient reports no Shortness of breathe. Patient has history of Diabetes. States he has no taken his insulin because he is trying to get off of them himself.

## 2017-05-08 NOTE — ED PROVIDER NOTES
Encounter Date: 5/8/2017       History     Chief Complaint   Patient presents with    Fall     fall last night, swelling around left eye, lives in assisted living center alone. not eating well, and not taking medication appropriately, CBG was 89, then 68, EMS gave oral glucose and D5,now over 100     Review of patient's allergies indicates:  No Known Allergies  HPI Comments: The patient presented to the emergency department with edema to his lower extremities and his face for the past 8 days.  The patient lives alone in assisted living.  For the past 2-3 days, his blood sugar has been low and he has stopped taking his insulin because he felt like his sugar was low.  He's had decreased appetite for the past 3 days as well.  The patient's son has been unable to reach him on the phone for the past 2 days.  Yesterday he had the manager let him in his father's apartment and his father was on the floor.  EMS was called, blood sugar was 39.  He was given by mouth glucose and was back at his baseline again.  The patient refused to come to the emergency department last night.  Today, he is weak again and when EMS arrived his blood sugar was 69.    The history is provided by the patient and a relative.     Past Medical History:   Diagnosis Date    Blind left eye     BPH (benign prostatic hyperplasia)     CHF (congestive heart failure)     COPD (chronic obstructive pulmonary disease)     Diabetes mellitus type I     Hyperlipidemia     Hypertension     Neuropathy     Prosthetic eye globe     PVD (peripheral vascular disease)      Past Surgical History:   Procedure Laterality Date    COLONOSCOPY  01/01/2009    CORONARY ARTERY BYPASS GRAFT      fem/popl revasc w/stent 2011       Family History   Problem Relation Age of Onset    Family history unknown: Yes     Social History   Substance Use Topics    Smoking status: Current Every Day Smoker    Smokeless tobacco: None    Alcohol use No     Review of Systems    Constitutional: Positive for activity change, appetite change and fatigue. Negative for fever.   HENT: Negative for sore throat.    Respiratory: Negative for shortness of breath.    Cardiovascular: Positive for leg swelling. Negative for chest pain and palpitations.   Gastrointestinal: Positive for abdominal distention. Negative for abdominal pain, diarrhea, nausea and vomiting.   Genitourinary: Positive for frequency. Negative for dysuria.   Musculoskeletal: Negative for back pain, neck pain and neck stiffness.   Skin: Negative for rash.   Neurological: Positive for dizziness, syncope and weakness. Negative for headaches.   Hematological: Does not bruise/bleed easily.   Psychiatric/Behavioral: Negative for confusion.       Physical Exam   Initial Vitals   BP Pulse Resp Temp SpO2   05/08/17 1450 05/08/17 1420 05/08/17 1412 05/08/17 1412 05/08/17 1450   161/93 68 12 97.6 °F (36.4 °C) 100 %     Physical Exam    Nursing note and vitals reviewed.  Constitutional: He appears well-developed and well-nourished.   HENT:   Head: Normocephalic and atraumatic.   Facial edema bilaterally   Eyes:   Artificial eye in the left orbit. Edema to bilateral eyelids and exudate to bilateral eyes   Neck: Normal range of motion. Neck supple. JVD present.   Cardiovascular: Normal rate, regular rhythm, normal heart sounds and intact distal pulses.   Pulmonary/Chest: No respiratory distress. He has no wheezes. He has rales (bibasilar).   Abdominal: Soft. Bowel sounds are normal. He exhibits no distension and no mass. There is no tenderness. There is no rebound and no guarding.   Musculoskeletal: Normal range of motion. He exhibits edema (2+ pretibial).   Lymphadenopathy:     He has no cervical adenopathy.   Neurological: He is alert and oriented to person, place, and time. He has normal strength.   Skin: Skin is warm and dry. No rash noted.   Psychiatric: He has a normal mood and affect. His behavior is normal. Thought content normal.    Judgement questionable. He refuses dialysis and is not clear on the consequences of that decision.         ED Course   Procedures  Labs Reviewed   CBC W/ AUTO DIFFERENTIAL - Abnormal; Notable for the following:        Result Value    RBC 2.73 (*)     Hemoglobin 7.8 (*)     Hematocrit 22.8 (*)     RDW 15.2 (*)     Platelets 105 (*)     Mono # 1.1 (*)     Lymph% 14.4 (*)     Mono% 15.2 (*)     Platelet Estimate Decreased (*)     All other components within normal limits   COMPREHENSIVE METABOLIC PANEL - Abnormal; Notable for the following:     Potassium 5.6 (*)     CO2 9 (*)     BUN, Bld 113 (*)     Creatinine 18.3 (*)     Calcium 4.8 (*)     Total Protein 8.5 (*)     Albumin 3.0 (*)     Alkaline Phosphatase 137 (*)     Anion Gap 20 (*)     eGFR if  3 (*)     eGFR if non  2 (*)     All other components within normal limits    Narrative:     CO2, Calcium, Phosphorus critical result(s) called and verbal   readback obtained from Gayle De La Garza RN., 05/08/2017 15:20   PHOSPHORUS - Abnormal; Notable for the following:     Phosphorus 11.5 (*)     All other components within normal limits    Narrative:     CO2, Calcium, Phosphorus critical result(s) called and verbal   readback obtained from Gayle De La Garza RN., 05/08/2017 15:20   PROTIME-INR - Abnormal; Notable for the following:     Prothrombin Time 16.0 (*)     INR 1.5 (*)     All other components within normal limits   MAGNESIUM   APTT   URINALYSIS   URINALYSIS MICROSCOPIC   POCT GLUCOSE     EKG Readings: (Independently Interpreted)   Heart Rate: 69. Ectopy: No Ectopy. Conduction: RBBB. Other Impression: LAFB, no peaked T waves          Medical Decision Making:   Clinical Tests:   Lab Tests: Ordered and Reviewed  The following lab test(s) were unremarkable: CBC, CMP, Troponin, Urinalysis, PT and PTT  Radiological Study: Ordered and Reviewed  Medical Tests: Ordered and Reviewed  ED Management:  The patient is here with edema and  weakness and hypoglycemia.  Today his kidney function has gone from a creatinine of 4 to a creatinine of 18, the Ca is 4.8, K is 5.6 and the CO2 is 9. Patient will be admitted to the [facility] Hospitalist service, Dr. Frances.  1655: Dr. Frances consulted  1705: Dr. Jay, Renal consulted for dialysis orders. She is coming to see the patient. She requests surgery for dialysis access.  1720: Dr. Rodriguez, Gen surgery consulted. He is coming to place dialysis access in the patient.                     ED Course     Clinical Impression:   The primary encounter diagnosis was Renal failure (ARF), acute on chronic. Diagnoses of Acute hyperkalemia, Hypocalcemia, and Metabolic acidosis were also pertinent to this visit.          Bhavani Parry MD  05/08/17 1703       Bhavani Parry MD  05/08/17 1733

## 2017-05-08 NOTE — ED NOTES
MD at bedside. Letting pt know he is in renal failure. MD informs pt he needs to be admitted to get on dialysis.

## 2017-05-08 NOTE — ED NOTES
"Patient states "he is ready to go home"  Let patient know MD will be in shortly to assess. Also waiting on all lab results   "

## 2017-05-08 NOTE — CONSULTS
NEPHROLOGY CONSULT NOTE    HPI & INTERVAL HISTORY:    Past Medical History:   Diagnosis Date    Blind left eye     BPH (benign prostatic hyperplasia)     CHF (congestive heart failure)     COPD (chronic obstructive pulmonary disease)     Diabetes mellitus type I     Hyperlipidemia     Hypertension     Neuropathy     Prosthetic eye globe     PVD (peripheral vascular disease)       Past Surgical History:   Procedure Laterality Date    COLONOSCOPY  01/01/2009    CORONARY ARTERY BYPASS GRAFT      fem/popl revasc w/stent 2011        Review of patient's allergies indicates:  No Known Allergies     (Not in a hospital admission)    Social History     Social History    Marital status: Single     Spouse name: N/A    Number of children: N/A    Years of education: N/A     Occupational History    Not on file.     Social History Main Topics    Smoking status: Current Every Day Smoker    Smokeless tobacco: Not on file    Alcohol use No    Drug use: No    Sexual activity: Not on file     Other Topics Concern    Not on file     Social History Narrative        MEDS   [START ON 5/9/2017] aspirin  81 mg Oral Daily    cloNIDine  0.2 mg Oral TID    [START ON 5/9/2017] escitalopram oxalate  20 mg Oral Daily    heparin (porcine)  5,000 Units Subcutaneous Q8H    [START ON 5/9/2017] insulin aspart protamine-insulin aspart  10 Units Subcutaneous BIDWM    metoprolol tartrate  100 mg Oral BID    simvastatin  20 mg Oral QHS    sodium chloride 0.9%  3 mL Intravenous Q8H           CONTINOUS INFUSIONS:    No intake or output data in the 24 hours ending 05/08/17 1746     HEMODYNAMICS:    Temp:  [97.6 °F (36.4 °C)] 97.6 °F (36.4 °C)  Pulse:  [67-70] 70  Resp:  [12] 12  SpO2:  [98 %-100 %] 98 %  BP: (124-162)/() 162/73   C/o nausea, decreased intake, weakness, low extremities edema, weight gain.  Reports loose BM.  Denies vomiting, chest pain, shortness of breath, cough,  abdominal pain.  Reports frequent  urination.   Denies hematuria, dysuria.  Gen: NAD  Cards: Pulse 82  /73  Pul:diminished breath sounds   Abdomen soft  Ext:edema   Skin: dry   Asterixis positive  LABS   Lab Results   Component Value Date    WBC 7.24 05/08/2017    HGB 7.8 (L) 05/08/2017    HCT 22.8 (L) 05/08/2017    MCV 84 05/08/2017     (L) 05/08/2017          Recent Labs  Lab 05/08/17  1413   GLU 97   CALCIUM 4.8*   ALBUMIN 3.0*   PROT 8.5*      K 5.6*   CO2 9*      *   CREATININE 18.3*   ALKPHOS 137*   ALT 15   AST 36   BILITOT 0.5      Lab Results   Component Value Date    CALCIUM 4.8 (LL) 05/08/2017    PHOS 11.5 (HH) 05/08/2017      No results found for: IRON, TIBC, FERRITIN     ABG  No results for input(s): PH, PO2, PCO2, HCO3, BE in the last 168 hours.      IMAGING:  CXR    ASSESSMENT / PLAN  Diabetes Mellitus.  Diabetic Retinopathy.  Blind Left blind (trauma 1957).  Hypertension.  CAD. CABG.  PVD.    ESRD.  Proteinuria 3+. UA RBC 20, WBC 5.  Uremia.  Hyperkalemia. K 5.6.  Metabolic acidosis.  Metabolic bone disease.  Hyperphosphatemia. Phos 11.  Anemia. Hb 7.8. Iron study.  Poor nutrition.  Albumin 3.  Blood pressure 167/88.  Discussed with patient and daughter in law about hemodialysis.  Explained  risk of bleeding,  arrhythmia,  Hypotension, death during HD.  Will HD after siddharth catheter placement.  Weight daily.  I and O.  Renal , ADA diet.

## 2017-05-08 NOTE — CONSULTS
"[facility] - Neuroendocrine Surgery Service  Consult Note    Attending Physician: Leland  Consulting Physician: Misty    Chief Complaint  ESRD in need of HD access     HPI  Tommie Salmeron is a 72 y.o. male presenting with edema to LE and face. H/o DM, HTN, CKD, CAD s/p CABG, PVD. Found in assisted living facility on ground. Sugar 39. Currently c/o nausea, decreased intake, weakness, low extremities edema, weight gain.  Reports loose BMWorkup in ED showed progression of renal disease to ESRD. Surgery consulted for urgent HD line placement. Renal planning to dialyze tonight     ROS  A 10pt ROS was performed and was negative other than in HPI     PMH  Past Medical History:   Diagnosis Date    Blind left eye     BPH (benign prostatic hyperplasia)     CHF (congestive heart failure)     COPD (chronic obstructive pulmonary disease)     Diabetes mellitus type I     Hyperlipidemia     Hypertension     Neuropathy     Prosthetic eye globe     PVD (peripheral vascular disease)    Pt also notes "blood clot in leg". States he was on blood thinner at one time but unsure what it was or if he takes it now.     PSH  -CABG  -R fem-pop bypass  -Abd surgery, possible cholecystectomy, pt has R subcostal incision and 2 RUQ scars. Pt does not distinctly recall what surgery he had      Medications  No current facility-administered medications on file prior to encounter.      Current Outpatient Prescriptions on File Prior to Encounter   Medication Sig Dispense Refill    aspirin (ECOTRIN) 81 MG EC tablet Take 1 tablet (81 mg total) by mouth once daily. 90 tablet 3    BLOOD SUGAR DIAGNOSTIC (TRUETEST TEST STRIPS MISC) by Misc.(Non-Drug; Combo Route) route.      cloNIDine (CATAPRES) 0.2 MG tablet TAKE ONE TABLET BY MOUTH THREE TIMES DAILY 270 tablet 0    escitalopram oxalate (LEXAPRO) 20 MG tablet TAKE ONE TABLET BY MOUTH ONCE DAILY 30 tablet 0    furosemide (LASIX) 20 MG tablet TAKE ONE TABLET BY MOUTH ONCE DAILY 90 " "tablet 0    insulin safety needles, disp, (PRODIGY PEN NEEDLE) 29 x 1/2 " Ndle 1 Units by Misc.(Non-Drug; Combo Route) route 2 (two) times daily. Flex pen needles 200 each 12    lancets Misc USE  each 3    LYRICA 75 mg capsule TAKE ONE CAPSULE BY MOUTH NIGHTLY 90 capsule 0    metoprolol tartrate (LOPRESSOR) 100 MG tablet Take 0.5 tablets (50 mg total) by mouth once daily. 180 tablet 3    NOVOLIN 70/30 100 unit/mL (70-30) injection INJECT 10 UNITS SUB-Q TWICE DAILY 1 vial 11    pantoprazole (PROTONIX) 40 MG tablet TAKE ONE TABLET BY MOUTH ONCE DAILY 90 tablet 0    simvastatin (ZOCOR) 20 MG tablet TAKE ONE TABLET BY MOUTH EVERY EVENING 90 tablet 0    spironolactone (ALDACTONE) 25 MG tablet TAKE ONE TABLET BY MOUTH ONCE DAILY 90 tablet 0        Allergies  Review of patient's allergies indicates:  No Known Allergies     Social History  Social History     Social History    Marital status: Single     Spouse name: N/A    Number of children: N/A    Years of education: N/A     Occupational History    Not on file.     Social History Main Topics    Smoking status: Current Every Day Smoker    Smokeless tobacco: Not on file    Alcohol use No    Drug use: No    Sexual activity: Not on file     Other Topics Concern    Not on file     Social History Narrative        Family History  Family History   Problem Relation Age of Onset    Family history unknown: Yes        Scheduled Meds:   sodium chloride 0.9%   Intravenous Once    [START ON 5/9/2017] aspirin  81 mg Oral Daily    calcium gluconate IVPB  1,000 mg Intravenous Once    citric acid-sodium citrate 500-334 mg/5 ml  30 mL Oral TID    citric acid-sodium citrate 500-334 mg/5 ml  45 mL Oral Once    cloNIDine  0.2 mg Oral TID    [START ON 5/9/2017] escitalopram oxalate  20 mg Oral Daily    heparin (porcine)  5,000 Units Subcutaneous Q8H    [START ON 5/9/2017] insulin aspart protamine-insulin aspart  10 Units Subcutaneous BIDWM    metoprolol " tartrate  100 mg Oral BID    [START ON 5/9/2017] sevelamer carbonate  800 mg Oral TID WM    simvastatin  20 mg Oral QHS    sodium chloride 0.9%  3 mL Intravenous Q8H     Continuous Infusions:   PRN Meds:sodium chloride 0.9%, dextrose 50%, dextrose 50%, glucagon (human recombinant), glucose, glucose, insulin aspart     Objective  Temp:  [97.6 °F (36.4 °C)] 97.6 °F (36.4 °C)  Pulse:  [67-70] 68  Resp:  [12] 12  SpO2:  [98 %-100 %] 100 %  BP: (124-167)/() 167/88       Physical Exam  Gen: NAD  Resp: Unlabored, mild rales b/l  CV: RRR  Abd: Soft, NT, ND, R subcostal incision noted  Ext: b/i LE edema 2+     Labs  Recent Labs      05/08/17   1413   WBC  7.24   HGB  7.8*   HCT  22.8*   PLT  105*   INR  1.5*     Recent Labs      05/08/17   1413   NA  136   K  5.6*   CL  107   CO2  9*   BUN  113*   CREATININE  18.3*   GLU  97   CALCIUM  4.8*   MG  1.6   PHOS  11.5*   PROT  8.5*   ALBUMIN  3.0*   BILITOT  0.5   AST  36   ALKPHOS  137*   ALT  15     Assessment/Plan  71 yo M with progression to ESRD. In need of HD access.    -will place temporary HD line in ED today. Please see procedure note  -HD per renal  -will book for OR on 5/8 for permacath placement  -will also discuss with renal regarding prognosis and possible need for fistula. If pt is indeed ESRD will need fistula, will book for tentatively for 5/12. Will get doppler of UE to eval veins     David Rivero MD HO2  [facility] General Surgery  Cell: [phone]    5/8/2017  6:34 PM

## 2017-05-08 NOTE — ED NOTES
MD at bedside. Friend now at bedside. Family states that eyes have been swollen since Sunday of last week. States there was green drainage coming from eyes then

## 2017-05-09 ENCOUNTER — ANESTHESIA EVENT (OUTPATIENT)
Dept: SURGERY | Facility: HOSPITAL | Age: 72
DRG: 683 | End: 2017-05-09
Payer: MEDICARE

## 2017-05-09 ENCOUNTER — SURGERY (OUTPATIENT)
Age: 72
End: 2017-05-09

## 2017-05-09 ENCOUNTER — ANESTHESIA (OUTPATIENT)
Dept: SURGERY | Facility: HOSPITAL | Age: 72
DRG: 683 | End: 2017-05-09
Payer: MEDICARE

## 2017-05-09 PROBLEM — E83.51 HYPOCALCEMIA: Status: ACTIVE | Noted: 2017-05-09

## 2017-05-09 PROBLEM — E87.5 ACUTE HYPERKALEMIA: Status: ACTIVE | Noted: 2017-05-09

## 2017-05-09 PROBLEM — E87.20 METABOLIC ACIDOSIS: Status: ACTIVE | Noted: 2017-05-09

## 2017-05-09 LAB
25(OH)D3+25(OH)D2 SERPL-MCNC: 11 NG/ML
ABO + RH BLD: NORMAL
ALBUMIN SERPL BCP-MCNC: 2.5 G/DL
ALBUMIN SERPL BCP-MCNC: 2.7 G/DL
ALBUMIN SERPL BCP-MCNC: 2.7 G/DL
ALP SERPL-CCNC: 120 U/L
ALP SERPL-CCNC: 121 U/L
ALP SERPL-CCNC: 130 U/L
ALT SERPL W/O P-5'-P-CCNC: 12 U/L
ALT SERPL W/O P-5'-P-CCNC: 13 U/L
ALT SERPL W/O P-5'-P-CCNC: 14 U/L
ANION GAP SERPL CALC-SCNC: 13 MMOL/L
ANION GAP SERPL CALC-SCNC: 15 MMOL/L
ANION GAP SERPL CALC-SCNC: 19 MMOL/L
ANISOCYTOSIS BLD QL SMEAR: SLIGHT
AST SERPL-CCNC: 27 U/L
AST SERPL-CCNC: 29 U/L
AST SERPL-CCNC: 32 U/L
BACTERIA UR CULT: NO GROWTH
BASOPHILS # BLD AUTO: 0 K/UL
BASOPHILS NFR BLD: 0 %
BILIRUB SERPL-MCNC: 0.7 MG/DL
BILIRUB SERPL-MCNC: 0.7 MG/DL
BILIRUB SERPL-MCNC: 1 MG/DL
BLD GP AB SCN CELLS X3 SERPL QL: NORMAL
BUN SERPL-MCNC: 44 MG/DL
BUN SERPL-MCNC: 79 MG/DL
BUN SERPL-MCNC: 81 MG/DL
CALCIUM SERPL-MCNC: 5.2 MG/DL
CALCIUM SERPL-MCNC: 5.4 MG/DL
CALCIUM SERPL-MCNC: 6.2 MG/DL
CHLORIDE SERPL-SCNC: 100 MMOL/L
CHLORIDE SERPL-SCNC: 102 MMOL/L
CHLORIDE SERPL-SCNC: 103 MMOL/L
CO2 SERPL-SCNC: 15 MMOL/L
CO2 SERPL-SCNC: 20 MMOL/L
CO2 SERPL-SCNC: 24 MMOL/L
CREAT SERPL-MCNC: 13.6 MG/DL
CREAT SERPL-MCNC: 14.2 MG/DL
CREAT SERPL-MCNC: 8.4 MG/DL
DACRYOCYTES BLD QL SMEAR: ABNORMAL
DIFFERENTIAL METHOD: ABNORMAL
EOSINOPHIL # BLD AUTO: 0 K/UL
EOSINOPHIL NFR BLD: 0.3 %
ERYTHROCYTE [DISTWIDTH] IN BLOOD BY AUTOMATED COUNT: 16.6 %
EST. GFR  (AFRICAN AMERICAN): 4 ML/MIN/1.73 M^2
EST. GFR  (AFRICAN AMERICAN): 4 ML/MIN/1.73 M^2
EST. GFR  (AFRICAN AMERICAN): 7 ML/MIN/1.73 M^2
EST. GFR  (NON AFRICAN AMERICAN): 3 ML/MIN/1.73 M^2
EST. GFR  (NON AFRICAN AMERICAN): 3 ML/MIN/1.73 M^2
EST. GFR  (NON AFRICAN AMERICAN): 6 ML/MIN/1.73 M^2
ESTIMATED AVG GLUCOSE: 151 MG/DL
ESTIMATED PA SYSTOLIC PRESSURE: 51.56
FERRITIN SERPL-MCNC: 797 NG/ML
FERRITIN SERPL-MCNC: 797 NG/ML
FOLATE SERPL-MCNC: 6.2 NG/ML
GLUCOSE SERPL-MCNC: 102 MG/DL
GLUCOSE SERPL-MCNC: 102 MG/DL
GLUCOSE SERPL-MCNC: 56 MG/DL
HAV IGM SERPL QL IA: NEGATIVE
HBA1C MFR BLD HPLC: 6.9 %
HBV CORE IGM SERPL QL IA: NEGATIVE
HBV SURFACE AG SERPL QL IA: NEGATIVE
HBV SURFACE AG SERPL QL IA: NEGATIVE
HCT VFR BLD AUTO: 19.3 %
HCV AB SERPL QL IA: NEGATIVE
HGB BLD-MCNC: 6.9 G/DL
IRON SERPL-MCNC: 51 UG/DL
IRON SERPL-MCNC: 51 UG/DL
LYMPHOCYTES # BLD AUTO: 0.7 K/UL
LYMPHOCYTES NFR BLD: 12 %
MAGNESIUM SERPL-MCNC: 1.3 MG/DL
MCH RBC QN AUTO: 28.6 PG
MCHC RBC AUTO-ENTMCNC: 35.8 %
MCV RBC AUTO: 80 FL
MONOCYTES # BLD AUTO: 0.7 K/UL
MONOCYTES NFR BLD: 11.7 %
NEUTROPHILS # BLD AUTO: 4.4 K/UL
NEUTROPHILS NFR BLD: 75.1 %
PHOSPHATE SERPL-MCNC: 4.6 MG/DL
PHOSPHATE SERPL-MCNC: 7.9 MG/DL
PLATELET # BLD AUTO: 94 K/UL
PLATELET BLD QL SMEAR: ABNORMAL
PMV BLD AUTO: 9.6 FL
POCT GLUCOSE: 100 MG/DL (ref 70–110)
POCT GLUCOSE: 106 MG/DL (ref 70–110)
POCT GLUCOSE: 156 MG/DL (ref 70–110)
POCT GLUCOSE: 167 MG/DL (ref 70–110)
POCT GLUCOSE: 74 MG/DL (ref 70–110)
POIKILOCYTOSIS BLD QL SMEAR: SLIGHT
POLYCHROMASIA BLD QL SMEAR: ABNORMAL
POTASSIUM SERPL-SCNC: 3.7 MMOL/L
POTASSIUM SERPL-SCNC: 4.2 MMOL/L
POTASSIUM SERPL-SCNC: 4.2 MMOL/L
PROT SERPL-MCNC: 6.8 G/DL
PROT SERPL-MCNC: 7.3 G/DL
PROT SERPL-MCNC: 7.3 G/DL
RBC # BLD AUTO: 2.41 M/UL
RETIRED EF AND QEF - SEE NOTES: 60 (ref 55–65)
SATURATED IRON: 25 %
SATURATED IRON: 25 %
SODIUM SERPL-SCNC: 137 MMOL/L
TARGETS BLD QL SMEAR: ABNORMAL
TOTAL IRON BINDING CAPACITY: 203 UG/DL
TOTAL IRON BINDING CAPACITY: 203 UG/DL
TRANSFERRIN SERPL-MCNC: 137 MG/DL
TRANSFERRIN SERPL-MCNC: 137 MG/DL
TRICUSPID VALVE REGURGITATION: ABNORMAL
TSH SERPL DL<=0.005 MIU/L-ACNC: 1.09 UIU/ML
VIT B12 SERPL-MCNC: 1242 PG/ML
WBC # BLD AUTO: 5.83 K/UL

## 2017-05-09 PROCEDURE — 37000008 HC ANESTHESIA 1ST 15 MINUTES: Performed by: SURGERY

## 2017-05-09 PROCEDURE — C1750 CATH, HEMODIALYSIS,LONG-TERM: HCPCS | Performed by: SURGERY

## 2017-05-09 PROCEDURE — 82607 VITAMIN B-12: CPT

## 2017-05-09 PROCEDURE — 84466 ASSAY OF TRANSFERRIN: CPT

## 2017-05-09 PROCEDURE — 63600175 PHARM REV CODE 636 W HCPCS: Performed by: INTERNAL MEDICINE

## 2017-05-09 PROCEDURE — 63600175 PHARM REV CODE 636 W HCPCS: Performed by: NURSE ANESTHETIST, CERTIFIED REGISTERED

## 2017-05-09 PROCEDURE — 25000003 PHARM REV CODE 250: Performed by: INTERNAL MEDICINE

## 2017-05-09 PROCEDURE — 84443 ASSAY THYROID STIM HORMONE: CPT

## 2017-05-09 PROCEDURE — 93307 TTE W/O DOPPLER COMPLETE: CPT

## 2017-05-09 PROCEDURE — 83036 HEMOGLOBIN GLYCOSYLATED A1C: CPT

## 2017-05-09 PROCEDURE — 25000003 PHARM REV CODE 250: Performed by: SURGERY

## 2017-05-09 PROCEDURE — 36000706: Performed by: SURGERY

## 2017-05-09 PROCEDURE — 36415 COLL VENOUS BLD VENIPUNCTURE: CPT

## 2017-05-09 PROCEDURE — 80053 COMPREHEN METABOLIC PANEL: CPT

## 2017-05-09 PROCEDURE — 02HV33Z INSERTION OF INFUSION DEVICE INTO SUPERIOR VENA CAVA, PERCUTANEOUS APPROACH: ICD-10-PCS | Performed by: SURGERY

## 2017-05-09 PROCEDURE — 82652 VIT D 1 25-DIHYDROXY: CPT

## 2017-05-09 PROCEDURE — 86901 BLOOD TYPING SEROLOGIC RH(D): CPT

## 2017-05-09 PROCEDURE — 84100 ASSAY OF PHOSPHORUS: CPT | Mod: 91

## 2017-05-09 PROCEDURE — 86580 TB INTRADERMAL TEST: CPT | Performed by: STUDENT IN AN ORGANIZED HEALTH CARE EDUCATION/TRAINING PROGRAM

## 2017-05-09 PROCEDURE — 36000707: Performed by: SURGERY

## 2017-05-09 PROCEDURE — 82746 ASSAY OF FOLIC ACID SERUM: CPT

## 2017-05-09 PROCEDURE — 11000001 HC ACUTE MED/SURG PRIVATE ROOM

## 2017-05-09 PROCEDURE — 84100 ASSAY OF PHOSPHORUS: CPT

## 2017-05-09 PROCEDURE — 37000009 HC ANESTHESIA EA ADD 15 MINS: Performed by: SURGERY

## 2017-05-09 PROCEDURE — 86920 COMPATIBILITY TEST SPIN: CPT

## 2017-05-09 PROCEDURE — 83735 ASSAY OF MAGNESIUM: CPT

## 2017-05-09 PROCEDURE — 25000003 PHARM REV CODE 250: Performed by: NURSE ANESTHETIST, CERTIFIED REGISTERED

## 2017-05-09 PROCEDURE — 82306 VITAMIN D 25 HYDROXY: CPT

## 2017-05-09 PROCEDURE — 85025 COMPLETE CBC W/AUTO DIFF WBC: CPT

## 2017-05-09 PROCEDURE — 63600175 PHARM REV CODE 636 W HCPCS: Performed by: STUDENT IN AN ORGANIZED HEALTH CARE EDUCATION/TRAINING PROGRAM

## 2017-05-09 PROCEDURE — 90935 HEMODIALYSIS ONE EVALUATION: CPT

## 2017-05-09 PROCEDURE — 82728 ASSAY OF FERRITIN: CPT

## 2017-05-09 PROCEDURE — 94761 N-INVAS EAR/PLS OXIMETRY MLT: CPT

## 2017-05-09 PROCEDURE — 83540 ASSAY OF IRON: CPT

## 2017-05-09 PROCEDURE — 86900 BLOOD TYPING SEROLOGIC ABO: CPT

## 2017-05-09 DEVICE — CATH DIALYSIS HEMOSPLIT16FR 23: Type: IMPLANTABLE DEVICE | Site: CHEST | Status: FUNCTIONAL

## 2017-05-09 RX ORDER — SODIUM CHLORIDE 9 MG/ML
INJECTION, SOLUTION INTRAVENOUS ONCE
Status: DISCONTINUED | OUTPATIENT
Start: 2017-05-09 | End: 2017-05-12

## 2017-05-09 RX ORDER — HEPARIN SODIUM 1000 [USP'U]/ML
INJECTION, SOLUTION INTRAVENOUS; SUBCUTANEOUS
Status: DISCONTINUED | OUTPATIENT
Start: 2017-05-09 | End: 2017-05-09 | Stop reason: HOSPADM

## 2017-05-09 RX ORDER — SODIUM CHLORIDE 9 MG/ML
INJECTION, SOLUTION INTRAVENOUS
Status: DISCONTINUED | OUTPATIENT
Start: 2017-05-09 | End: 2017-05-12

## 2017-05-09 RX ORDER — HEPARIN SODIUM 10000 [USP'U]/ML
INJECTION, SOLUTION INTRAVENOUS; SUBCUTANEOUS
Status: DISCONTINUED | OUTPATIENT
Start: 1840-12-31 | End: 2017-05-09 | Stop reason: HOSPADM

## 2017-05-09 RX ORDER — ONDANSETRON HYDROCHLORIDE 2 MG/ML
INJECTION, SOLUTION INTRAMUSCULAR; INTRAVENOUS
Status: DISCONTINUED | OUTPATIENT
Start: 2017-05-09 | End: 2017-05-09

## 2017-05-09 RX ORDER — PROPOFOL 10 MG/ML
VIAL (ML) INTRAVENOUS
Status: DISCONTINUED | OUTPATIENT
Start: 2017-05-09 | End: 2017-05-09

## 2017-05-09 RX ORDER — BACITRACIN 50000 [IU]/1
INJECTION, POWDER, FOR SOLUTION INTRAMUSCULAR
Status: DISCONTINUED | OUTPATIENT
Start: 2017-05-09 | End: 2017-05-09 | Stop reason: HOSPADM

## 2017-05-09 RX ORDER — HYDRALAZINE HYDROCHLORIDE 25 MG/1
25 TABLET, FILM COATED ORAL EVERY 8 HOURS
Status: DISCONTINUED | OUTPATIENT
Start: 2017-05-09 | End: 2017-05-09

## 2017-05-09 RX ORDER — HYDRALAZINE HYDROCHLORIDE 25 MG/1
50 TABLET, FILM COATED ORAL EVERY 8 HOURS
Status: DISCONTINUED | OUTPATIENT
Start: 2017-05-09 | End: 2017-05-12

## 2017-05-09 RX ORDER — LIDOCAINE HCL/PF 100 MG/5ML
SYRINGE (ML) INTRAVENOUS
Status: DISCONTINUED | OUTPATIENT
Start: 2017-05-09 | End: 2017-05-09

## 2017-05-09 RX ORDER — HYDRALAZINE HYDROCHLORIDE 20 MG/ML
10 INJECTION INTRAMUSCULAR; INTRAVENOUS EVERY 6 HOURS PRN
Status: DISCONTINUED | OUTPATIENT
Start: 2017-05-09 | End: 2017-05-12

## 2017-05-09 RX ORDER — LIDOCAINE HYDROCHLORIDE 10 MG/ML
INJECTION, SOLUTION EPIDURAL; INFILTRATION; INTRACAUDAL; PERINEURAL
Status: DISCONTINUED | OUTPATIENT
Start: 2017-05-09 | End: 2017-05-09 | Stop reason: HOSPADM

## 2017-05-09 RX ORDER — MAGNESIUM SULFATE HEPTAHYDRATE 40 MG/ML
2 INJECTION, SOLUTION INTRAVENOUS ONCE
Status: COMPLETED | OUTPATIENT
Start: 2017-05-09 | End: 2017-05-09

## 2017-05-09 RX ORDER — BUPIVACAINE HYDROCHLORIDE 2.5 MG/ML
INJECTION, SOLUTION EPIDURAL; INFILTRATION; INTRACAUDAL
Status: DISCONTINUED | OUTPATIENT
Start: 2017-05-09 | End: 2017-05-09 | Stop reason: HOSPADM

## 2017-05-09 RX ORDER — SODIUM CHLORIDE 9 MG/ML
INJECTION, SOLUTION INTRAVENOUS CONTINUOUS PRN
Status: DISCONTINUED | OUTPATIENT
Start: 2017-05-09 | End: 2017-05-09

## 2017-05-09 RX ADMIN — SODIUM CHLORIDE, PRESERVATIVE FREE 3 ML: 5 INJECTION INTRAVENOUS at 05:05

## 2017-05-09 RX ADMIN — HYDRALAZINE HYDROCHLORIDE 25 MG: 25 TABLET, FILM COATED ORAL at 04:05

## 2017-05-09 RX ADMIN — CALCIUM GLUCONATE 1000 MG: 94 INJECTION, SOLUTION INTRAVENOUS at 04:05

## 2017-05-09 RX ADMIN — CLONIDINE HYDROCHLORIDE 0.3 MG: 0.3 TABLET ORAL at 12:05

## 2017-05-09 RX ADMIN — PROPOFOL 120 MG: 10 INJECTION, EMULSION INTRAVENOUS at 12:05

## 2017-05-09 RX ADMIN — CLONIDINE HYDROCHLORIDE 0.2 MG: 0.2 TABLET ORAL at 05:05

## 2017-05-09 RX ADMIN — METOPROLOL TARTRATE 100 MG: 50 TABLET ORAL at 09:05

## 2017-05-09 RX ADMIN — HEPARIN SODIUM 10000 UNITS: 1000 INJECTION, SOLUTION INTRAVENOUS; SUBCUTANEOUS at 01:05

## 2017-05-09 RX ADMIN — TUBERCULIN PURIFIED PROTEIN DERIVATIVE 5 UNITS: 5 INJECTION, SOLUTION INTRADERMAL at 02:05

## 2017-05-09 RX ADMIN — SODIUM CITRATE AND CITRIC ACID MONOHYDRATE 30 ML: 500; 334 SOLUTION ORAL at 05:05

## 2017-05-09 RX ADMIN — CALCIUM GLUCONATE 1000 MG: 94 INJECTION, SOLUTION INTRAVENOUS at 10:05

## 2017-05-09 RX ADMIN — SODIUM CHLORIDE: 0.9 INJECTION, SOLUTION INTRAVENOUS at 12:05

## 2017-05-09 RX ADMIN — HYDRALAZINE HYDROCHLORIDE 50 MG: 25 TABLET, FILM COATED ORAL at 12:05

## 2017-05-09 RX ADMIN — LIDOCAINE HYDROCHLORIDE 50 MG: 20 INJECTION, SOLUTION INTRAVENOUS at 12:05

## 2017-05-09 RX ADMIN — SIMVASTATIN 20 MG: 20 TABLET, FILM COATED ORAL at 09:05

## 2017-05-09 RX ADMIN — HYDRALAZINE HYDROCHLORIDE 50 MG: 25 TABLET, FILM COATED ORAL at 09:05

## 2017-05-09 RX ADMIN — CALCIUM GLUCONATE 1000 MG: 94 INJECTION, SOLUTION INTRAVENOUS at 09:05

## 2017-05-09 RX ADMIN — MAGNESIUM SULFATE IN WATER 2 G: 40 INJECTION, SOLUTION INTRAVENOUS at 10:05

## 2017-05-09 RX ADMIN — ONDANSETRON 4 MG: 2 INJECTION, SOLUTION INTRAMUSCULAR; INTRAVENOUS at 01:05

## 2017-05-09 RX ADMIN — BUPIVACAINE HYDROCHLORIDE 30 ML: 2.5 INJECTION, SOLUTION EPIDURAL; INFILTRATION; INTRACAUDAL; PERINEURAL at 01:05

## 2017-05-09 RX ADMIN — CLONIDINE HYDROCHLORIDE 0.3 MG: 0.3 TABLET ORAL at 09:05

## 2017-05-09 RX ADMIN — SODIUM CHLORIDE, PRESERVATIVE FREE 3 ML: 5 INJECTION INTRAVENOUS at 02:05

## 2017-05-09 RX ADMIN — HEPARIN SODIUM 5000 UNITS: 5000 INJECTION, SOLUTION INTRAVENOUS; SUBCUTANEOUS at 02:05

## 2017-05-09 RX ADMIN — BACITRACIN 50000 UNITS: 5000 INJECTION, POWDER, FOR SOLUTION INTRAMUSCULAR at 01:05

## 2017-05-09 RX ADMIN — HEPARIN SODIUM 5000 UNITS: 5000 INJECTION, SOLUTION INTRAVENOUS; SUBCUTANEOUS at 09:05

## 2017-05-09 RX ADMIN — DEXTROSE 2 G: 50 INJECTION, SOLUTION INTRAVENOUS at 01:05

## 2017-05-09 RX ADMIN — CALCIUM GLUCONATE 1000 MG: 94 INJECTION, SOLUTION INTRAVENOUS at 02:05

## 2017-05-09 RX ADMIN — HYDRALAZINE HYDROCHLORIDE 10 MG: 20 INJECTION INTRAMUSCULAR; INTRAVENOUS at 10:05

## 2017-05-09 RX ADMIN — ESCITALOPRAM 20 MG: 10 TABLET, FILM COATED ORAL at 08:05

## 2017-05-09 RX ADMIN — LIDOCAINE HYDROCHLORIDE 20 ML: 10 INJECTION, SOLUTION EPIDURAL; INFILTRATION; INTRACAUDAL; PERINEURAL at 01:05

## 2017-05-09 RX ADMIN — ASPIRIN 81 MG: 81 TABLET, COATED ORAL at 08:05

## 2017-05-09 RX ADMIN — SODIUM CITRATE AND CITRIC ACID MONOHYDRATE 30 ML: 500; 334 SOLUTION ORAL at 02:05

## 2017-05-09 RX ADMIN — HEPARIN SODIUM 5000 UNITS: 5000 INJECTION, SOLUTION INTRAVENOUS; SUBCUTANEOUS at 05:05

## 2017-05-09 NOTE — PHYSICIAN QUERY
PT Name: Tommie Salmeron  MR #: [id number]     Physician Query Form - Documentation Clarification      CDS/: Lia Andrea               Contact information:[email or web address]  This form is a permanent document in the medical record.     Query Date: May 9, 2017    By submitting this query, we are merely seeking further clarification of documentation. Please utilize your independent clinical judgment when addressing the question(s) below.    The Medical record reflects the following:    Supporting Clinical Findings Location in Medical Record     Uncontrolled DMII        H&P 5/8     Tommie Salmeron is a 72 y.o. male who has a past medical history of Blind left eye; BPH (benign prostatic hyperplasia); CHF (congestive heart failure); COPD (chronic obstructive pulmonary disease); Diabetes mellitus  type I; Hyperlipidemia; Hypertension; Neuropathy; Prosthetic eye globe; and PVD (peripheral vascular disease).        H&P 5/8                                                                            Doctor, Please specify diagnosis or diagnoses associated with above clinical findings.    Provider Use Only      [   ]  DM1    [ X  ]  DM2    [   ]  Other explanations with details_____________________________________                                                                                                               [  ] Clinically undetermined

## 2017-05-09 NOTE — PLAN OF CARE
Pt resides at [facility] and uses a rolling walker to ambulate. Pt was found on floor in apt after falling and per H&P pt was not taking medications properly. Pt admitted to hospital and also noted to have ESRD; out patient HD process initiated- all labs ordered per Neph, CXR on chart. TN to discuss out patient HD preference with pt when returns from line placement.    1625- Pt back in room ; sister at bedside and  need for out patient HD discussed; Pt and sister instructed TN to speak with pt's son Roslyn ( [phone]). TN called Roslyn and discussed plan for out patient HD,. Roslyn informed Tn he is concerned his father will be noncompliant with HD as he has refused it in the past but to send referral to [facility] (Dr. Fernandes) . TN notified PURA Gray and she will send referral .         05/09/17 2537   Discharge Assessment   Assessment Type Discharge Planning Assessment   Confirmed/corrected address and phone number on facesheet? Yes   Assessment information obtained from? Patient   Communicated expected length of stay with patient/caregiver yes   Prior to hospitilization cognitive status: Alert/Oriented   Prior to hospitalization functional status: Independent;Assistive Equipment   Current cognitive status: Alert/Oriented   Current Functional Status: Needs Assistance   Arrived From assisted living   Lives With alone   Able to Return to Prior Arrangements yes   Is patient able to care for self after discharge? Unable to determine at this time (comments)   Who are your caregiver(s) and their phone number(s)? Roslyn (son) [phone]   Patient's perception of discharge disposition assisted living   Readmission Within The Last 30 Days no previous admission in last 30 days   Patient currently being followed by outpatient case management? No   Patient currently receives home health services? No   Does the patient currently use HME? Yes   Patient currently receives private duty nursing?  "No   Patient currently receives any other outside agency services? No   Equipment Currently Used at Home walker, rolling;glucometer   Do you have any problems affording any of your prescribed medications? No   Is the patient taking medications as prescribed? no   If no, which medications is patient not taking? per H7P pt not taking meds correctly; when Tn attempted to discuss med complaince with pt , pt stated, "I just don't know why I wasn't taking them." Pt would not specify to Tn which meds not taking   Do you have any financial concerns preventing you from receiving the healthcare you need? No   Does the patient have transportation to healthcare appointments? Yes   Transportation Available family or friend will provide   On Dialysis? No  (will need HD arranged)   Does the patient receive services at the [facility]? No   Are there any open cases? No   Discharge Plan A Assisted Living;Home Health   Discharge Plan B Skilled Nursing Facility   Patient/Family In Agreement With Plan yes       "

## 2017-05-09 NOTE — PROGRESS NOTES
Tolerating HD.  /70.  UF goal 1300 cc.  HD in am tomorrow.  Replace magnesium, calcium.  Type and screen.

## 2017-05-09 NOTE — TRANSFER OF CARE
"Anesthesia Transfer of Care Note    Patient: Tommie Salmeron    Procedure(s) Performed: Procedure(s) (LRB):  PLACEMENT-CATHETHER-TUNNEL (N/A)    Patient location: ICU    Anesthesia Type: general    Transport from OR: Transported from OR on 6-10 L/min O2 by face mask with adequate spontaneous ventilation    Post pain: adequate analgesia    Post assessment: no apparent anesthetic complications    Post vital signs: stable    Level of consciousness: sedated and responds to stimulation (returned to baseline from when pt was picked up from ICU)    Nausea/Vomiting: no nausea/vomiting    Complications: none    Transfer of care protocol was followed      Last vitals:   Visit Vitals    BP (!) 173/75    Pulse 66    Temp 36.8 °C (98.3 °F)    Resp (!) 24    Ht 5' 9" (1.753 m)    Wt 89.9 kg (198 lb 3.1 oz)    SpO2 98%    BMI 29.27 kg/m2     "

## 2017-05-09 NOTE — PROGRESS NOTES
Patient arrived to  via bed with escort service and ICU RN. Patient oriented x4 but refused to open eyes and folow commands. Family at bedside. ICU RN states that he has been refusing to eat meals and cooperate because he states he wants to just go home. RCW Jonas intact with dressing c/d/i. Right gron site dressing c/d/i. Tele monitor placed on patient as per orders. WIll cont to monitor pt and intervene prn.IV site x2 to left hand dressing c/d/i.

## 2017-05-09 NOTE — PLAN OF CARE
Problem: Hemodialysis (Adult)  Goal: Signs and Symptoms of Listed Potential Problems Will be Absent, Minimized or Managed (Hemodialysis)  Signs and symptoms of listed potential problems will be absent, minimized or managed by discharge/transition of care (reference Hemodialysis (Adult) CPG).  Outcome: Ongoing (interventions implemented as appropriate)    05/08/17 2050   Hemodialysis   Problems Assessed (Hemodialysis) all   Problems Present (Hemodialysis) electrolyte imbalance   HD with Uf x 3h today. POC reviewed with pt.

## 2017-05-09 NOTE — NURSING TRANSFER
Nursing Transfer Note      5/9/2017     Transfer To: 419    Transfer via bed    Transfer with cardiac monitoring    Transported by RN and transport    Medicines sent: yes    Chart send with patient: Yes    Notified: sister and nephew    Patient reassessed at: 05/9/2017 @ 1745    Upon arrival to floor: cardiac monitor applied, patient oriented to room, call bell in reach and bed in lowest position, RN @ bedside. Belongings brought to room by sister.

## 2017-05-09 NOTE — ANESTHESIA POSTPROCEDURE EVALUATION
"Anesthesia Post Evaluation    Patient: Tommie Salmeron    Procedure(s) Performed: Procedure(s) (LRB):  PLACEMENT-CATHETHER-TUNNEL (Right)    Final Anesthesia Type: general  Patient location during evaluation: PACU  Patient participation: Yes- Able to Participate  Level of consciousness: awake and alert  Post-procedure vital signs: reviewed and stable  Pain management: adequate  Airway patency: patent  PONV status at discharge: No PONV  Anesthetic complications: no      Cardiovascular status: hemodynamically stable  Respiratory status: unassisted  Hydration status: euvolemic  Follow-up not needed.        Visit Vitals    BP (!) 155/67    Pulse 61    Temp 36.8 °C (98.3 °F)    Resp 15    Ht 5' 9" (1.753 m)    Wt 89.9 kg (198 lb 3.1 oz)    SpO2 97%    BMI 29.27 kg/m2       Pain/Jennifer Score: Pain Assessment Performed: Yes (5/9/2017  3:41 PM)  Presence of Pain: denies (5/9/2017  3:41 PM)  Pain Rating Prior to Med Admin: 0 (5/8/2017  8:00 PM)      "

## 2017-05-09 NOTE — OP NOTE
Preop: ESRD    Postop: same    Procedure:  1. Neck US  2. fluoroscopyc one hour   3. Rt int jug access tunnelled catheter    General Rodriguez/ david/ aby    EBL: minimal  Complications none  Specimen: none  [id number]

## 2017-05-09 NOTE — PROGRESS NOTES
Occupational Therapy   Evaluation Attempt    Tommie Salmeron   MRN: [id number]         Pt unavailable at this time- SHELBY in procedure. Will attempt again as available.       Estelle Meehan, OT  5/9/2017

## 2017-05-09 NOTE — PLAN OF CARE
Problem: Hemodialysis (Adult)  Goal: Signs and Symptoms of Listed Potential Problems Will be Absent, Minimized or Managed (Hemodialysis)  Signs and symptoms of listed potential problems will be absent, minimized or managed by discharge/transition of care (reference Hemodialysis (Adult) CPG).   Outcome: Ongoing (interventions implemented as appropriate)    05/09/17 0859   Hemodialysis   Problems Assessed (Hemodialysis) electrolyte imbalance;fluid imbalance   Problems Present (Hemodialysis) electrolyte imbalance;fluid imbalance   Hd with uf

## 2017-05-09 NOTE — PROCEDURES
Date of procedure: 5/8/17     Preop Dx:  1. ANIRUDH on CKD  2. CAD  3. DM  4. HTN     Postop Dx:  Same     Procedure  1. R femoral HD catheter placement with ultrasound guidance      Indications:  Mr. Salmeron is a 73 yo M with ANIRUDH on CKD now with ESRD. He is in need of HD access for urgent hemodialysis     Procedure in Detail:  Appropriate consent was obtained and all questions answer. The R femoral vein was visualized via ultrasound and found to be patent.     Area was prepped and drapped in the usual sterile fashion. Using US guidance and Seldinger technique a finder needle was used to gain access to R femoral vein. Wire was passed easily. Tract was dilated and catheter placed. Ports were flushed.     Post Procedure XR obtained     Dr. Rodriguez was present for procedure    David Rivero MD, HO2  \[facility]\"" General Surgery  cell: [phone]  pager: [phone]    5/8/2017  7:41 PM

## 2017-05-09 NOTE — ANESTHESIA PREPROCEDURE EVALUATION
05/09/2017  Tommie Salmeron is a 72 y.o., male with EF 40-45%, COPD, DM1 and ARF is scheduled for placement of dialysis catheter under MAC/GETA.     PRIOR ANES    ANES-RELATED MED/SURG 2017-05-09  HTN  DM  HLD  CAD  ESRD   - dialysis  CHF Hx (but normal EF on Echo)  PVD  COPD  Only  1 eye ( R is prosthesis)      Past Surgical History:   Procedure Laterality Date    COLONOSCOPY  01/01/2009    CORONARY ARTERY BYPASS GRAFT      fem/popl revasc w/stent 2011       ALLERGIES  Review of patient's allergies indicates:  No Known Allergies    ANES-RELATED MAR 12017-05-09  metoprolol insulin aspart-SS  Hydralazine  Clonidine  heparin 5000SC  simvistatin    Anesthesia Evaluation    I have reviewed the Patient Summary Reports.     I have reviewed the Medications.     Review of Systems  Anesthesia Hx:  No problems with previous Anesthesia  History of prior surgery of interest to airway management or planning:  Denies Personal Hx of Anesthesia complications.   Hematology/Oncology:     Oncology Normal    -- Anemia:   Cardiovascular:   Exercise tolerance: good Hypertension CAD   CHF    Pulmonary:   COPD    Renal/:   Chronic Renal Disease, ARF    Hepatic/GI:  Hepatic/GI Normal    Musculoskeletal:  Musculoskeletal Normal    Neurological:  Neurology Normal    Endocrine:   Diabetes, type 1      Wt Readings from Last 3 Encounters:   05/08/17 89.9 kg (198 lb 3.1 oz)   02/07/17 78.7 kg (173 lb 8 oz)   05/24/16 83.4 kg (183 lb 13.8 oz)     Temp Readings from Last 3 Encounters:   05/09/17 36.4 °C (97.6 °F) (Oral)   02/07/17 36.9 °C (98.4 °F) (Oral)   05/24/16 36.8 °C (98.2 °F) (Oral)     BP Readings from Last 3 Encounters:   05/09/17 (!) 224/93   02/07/17 130/72   05/24/16 127/64     Pulse Readings from Last 3 Encounters:   05/09/17 69   02/07/17 65   05/24/16 63       Physical Exam  General:  Well nourished     Airway/Jaw/Neck:  Airway Findings: Mouth Opening: Normal Tongue: Normal  Mallampati: I  TM Distance: Normal, at least 6 cm        Eyes/Ears/Nose:  EYES/EARS/NOSE FINDINGS: Normal   Dental:  Dental Findings:    Chest/Lungs:  Chest/Lungs Findings: Clear to auscultation, Normal Respiratory Rate     Heart/Vascular:  Heart Findings: Rate: Normal  Rhythm: Regular Rhythm  Sounds: Normal  Heart murmur: negative    Abdomen:  Abdomen Findings:  Normal, Soft       Mental Status:  Mental Status Findings:  Cooperative, Alert and Oriented       Lab Results   Component Value Date    WBC 5.83 05/09/2017    HGB 6.9 (L) 05/09/2017    HCT 19.3 (LL) 05/09/2017    MCV 80 (L) 05/09/2017    PLT 94 (L) 05/09/2017       Chemistry        Component Value Date/Time     05/09/2017 0825    K 4.2 05/09/2017 0825     05/09/2017 0825    CO2 20 (L) 05/09/2017 0825    BUN 81 (H) 05/09/2017 0825    CREATININE 14.2 (H) 05/09/2017 0825     05/09/2017 0825        Component Value Date/Time    CALCIUM 5.2 (LL) 05/09/2017 0825    ALKPHOS 121 05/09/2017 0825    AST 27 05/09/2017 0825    ALT 12 05/09/2017 0825    BILITOT 0.7 05/09/2017 0825        Lab Results   Component Value Date    ALBUMIN 2.5 (L) 05/09/2017     Lab Results   Component Value Date    TSH 1.086 05/09/2017     CXR 2017-05-08  Overall grossly stable chronic findings noting possible minimal interstitial edema/congestion without convincing large focal consolidation.  Slight haziness of the left costophrenic angle is suspected to be on the basis of positioning and soft tissue overlap with differential to include minimal layering effusion.    EKG 2017-05-08  Normal sinus rhythm  Possible Left atrial enlargement  Right bundle branch block  Left anterior fascicular block  Bifascicular block  T wave abnormality, consider lateral ischemia  Abnormal ECG  No previous ECGs available    ECHO 2017-05-09  Preliminary reading:  Good Valves; Good EF      Anesthesia Plan  Type of  Anesthesia, risks & benefits discussed:  Anesthesia Type:  MAC, general  Patient's Preference:   Intra-op Monitoring Plan:   Intra-op Monitoring Plan Comments:   Post Op Pain Control Plan:   Post Op Pain Control Plan Comments:   Induction:    Beta Blocker:  Patient is on a Beta-Blocker and has received one dose within the past 24 hours (No further documentation required).       Informed Consent: Patient understands risks and agrees with Anesthesia plan.  Questions answered. Anesthesia consent signed with patient.  ASA Score: 2     Day of Surgery Review of History & Physical:        Anesthesia Plan Notes: 2017-05-09   - Called son Roslyn Salmeron on 5/9/2017 8:14 AM at [phone] with no answer (Ma) -    - Convinced pt to sign his own consent (seems reasonable; Sarina)   - NPO   - dialysis in progress          Ready For Surgery From Anesthesia Perspective.

## 2017-05-09 NOTE — PROGRESS NOTES
"[facility] Resident HO-I Progress Note    Subjective:      Received emergent HD yesterday. Per nurse, HD without ~300ml output, oliguric overnight. Patient is without complaints this morning. Reports headache is better. Denies fevers, chills, N/V, SOB, CP, abdominal pain, diarrhea.      Objective:     Last 24 Hour Vital Signs:  BP  Min: 124/107  Max: 227/88  Temp  Av.2 °F (36.8 °C)  Min: 97.6 °F (36.4 °C)  Max: 98.6 °F (37 °C)  Pulse  Av.1  Min: 67  Max: 77  Resp  Av.8  Min: 12  Max: 29  SpO2  Av.8 %  Min: 95 %  Max: 100 %  Height  Av' 9" (175.3 cm)  Min: 5' 9" (175.3 cm)  Max: 5' 9" (175.3 cm)  Weight  Av.9 kg (198 lb 3.1 oz)  Min: 89.9 kg (198 lb 3.1 oz)  Max: 89.9 kg (198 lb 3.1 oz)  I/O last 3 completed shifts:  In: 600 [P.O.:100; Other:400; IV Piggyback:100]  Out: 550 [Other:550]    Physical Examination:  BP (!) 185/60  Pulse 72  Temp 98.5 °F (36.9 °C) (Oral)   Resp 15  Ht 5' 9" (1.753 m)  Wt 89.9 kg (198 lb 3.1 oz)  SpO2 98%  BMI 29.27 kg/m2    General: alert, cooperative, and no acute distress  Head: Normocephalic, without obvious abnormality, atraumatic  Eyes: conjunctivae/corneas clear; PERRLA, EOM's intact, L eye blind s/p prosthesis  Nose: Nares normal; septum midline; mucosa normal; no drainage or sinus tenderness  Throat: lips, mucosa, and tongue normal; teeth and gums normal  Neck: no adenopathy, supple, symmetrical, trachea midline and thyroid not enlarged, symmetric, no tenderness/mass/nodules  Lung: clear to auscultation throughout; no increased work of breathing  Heart: regular rate and rhythm, no M/R/G, normal S1/S2.  Abdomen: soft, non-tender; bowel sounds normal; no masses, no organomegaly  Extremities: 1+ edema to mid-knee, no cyanosis   Pulses: 2+ and symmetric  Skin: Skin color, texture, turgor normal; no rashes or lesions  Neuro: A&Ox3, 5/5 motor strength to UE, 3-4/5 to LE, normal sensation light touch b/l       Laboratory:  Laboratory Data " Reviewed: yes  Recent Labs      05/08/17   1413  05/09/17   0423   WBC  7.24  5.83   HGB  7.8*  6.9*   HCT  22.8*  19.3*   PLT  105*  94*   MCV  84  80*   RDW  15.2*  16.6*   GRAN  69.7  5.1  75.1*  4.4   LYMPH  14.4*  1.0  12.0*  0.7*   MONO  15.2*  1.1*  11.7  0.7   EOS  0.0  0.0   BASO  0.02  0.00   EOSINOPHIL  0.4  0.3   BASOPHIL  0.3  0.0       Recent Labs      05/08/17   1413  05/08/17   1825 05/08/17   2355   NA  136  137  137   K  5.6*  5.5*  4.2   CL  107  107  103   CO2  9*  12*  15*   BUN  113*  115*  79*   CREATININE  18.3*  18.5*  13.6*   GLU  97  76  56*     Recent Labs      05/08/17   1409  05/08/17   1823  05/09/17   0222  05/09/17   0653   POCTGLUCOSE  95  86  74  106     Recent Labs      05/08/17   1413  05/08/17   1825 05/08/17   2355   PROT  8.5*  8.0  7.3   ALBUMIN  3.0*  3.0*  2.7*   BILITOT  0.5  0.6  0.7   AST  36  32  32   ALT  15  15  14   ALKPHOS  137*  133  120       Microbiology Data Reviewed: yes  Microbiology Results (last 7 days)     Procedure Component Value Units Date/Time    Urine culture [[id number]] Collected:  05/08/17 1507    Order Status:  No result Specimen:  Urine Updated:  05/08/17 2027    Urine culture [[id number]]     Order Status:  Completed Specimen:  Urine from Urine, Clean Catch     Urine culture [[id number]]     Order Status:  Canceled Specimen:  Urine           Other Results:  EKG (my interpretation): NSR, LAE, RBB    Radiology Data Reviewed: yes  Reviewed    Current Medications:     Infusions:        Scheduled:   aspirin  81 mg Oral Daily    citric acid-sodium citrate 500-334 mg/5 ml  30 mL Oral TID    cloNIDine  0.2 mg Oral TID    escitalopram oxalate  20 mg Oral Daily    heparin (porcine)  5,000 Units Subcutaneous Q8H    hydrALAZINE  25 mg Oral Q8H    metoprolol tartrate  100 mg Oral BID    sevelamer carbonate  800 mg Oral TID WM    simvastatin  20 mg Oral QHS    sodium chloride 0.9%  3 mL Intravenous Q8H        PRN:  sodium chloride 0.9%, dextrose  50%, dextrose 50%, glucagon (human recombinant), glucose, glucose, heparin (porcine), hydrALAZINE, insulin aspart    Antibiotics and Day Number of Therapy:  Antibiotics     None          Assessment:     Tommie Salmeron is a 72 y.o.male with:    Plan:     Acute Renal Failure in setting of CKDV (now likely ESRD)  - follows Dr. Phillip  oliguric  has discussion for dialysis with nephrologist but refuses starting HD  - presented with K 5.6, BUN/Cr 113/18.3, Phos 11.5  baseline Cr 5-7  - EKG NSR, LAE, RBBB, LVH, L anterior fasicular block   - s/p HD in ED yesterday with ~300ml net ouput   - continue sevelamer  consulted L-nephro, plan for permatch today and vein eval for fistula     AGMA  - AG 20 on admit, trending down  likely 2/2 to ARF  - as above     Hyperkalemia/Hyperphosphatemia/Hypocalcemia   - K 5.6, Phos 11.5, Ca 4.8 on admit  likely 2/2 ARF  - continue sevelamer/citrate  s/p HD in ED  - electrolytes improving, monitor with BMP     Hypoglycemia  - 2 episodes of BG in 30's and received D50, then BG 65 the next day  - BG  on this admission  holding home meds Novolin 10U BID given hypoglycemia  - continue SSI and accuchecks     Fall with mild Rhabdomyolysis   - 1 episode of fall at assisted living  per family, >2hours LOC yesterday  - possibly related to hypoglycemia   - CK 1560  CT head with midline posterior parietal scalp mild soft tissue swelling/contusion near the vertex, without displaced skull fracture or acute intracranial findings identified  - pending ECHO     Asymptomatic Bacteruria/Hematuria   - UA with RBC 20, WBC 5, mod bacteria  - Will hold on abx for now  pending UCx     CAD s/p CABG  - continue ASA, simvastatin, Lopressor     Chronic Systolic and Diastolic Heart Failure  - 5/2015 ECHO with 40-45%, DD, mild MR/TR, MARILYN, possible restrictive CM  - CXR Overall grossly stable chronic findings noting possible minimal interstitial edema/congestion without convincing large focal  consolidation  - pending repeat ECHO     Uncontrolled DMII  - 5/2015 A1C 8.7  repeating A1C pending    - Home meds NPH 10U BID  will hold home insulin regime due to episodes of hypoglycemia  - SSI and accuchecks for now and adjust as needed     Normocytic Anemia/Thrombocytopenia  - H/H 9.2/26 on admit  baseline Hb 9-10  Plt 105, chronically low in past  - no signs of bleeding  pending Fe study  - Hb 6.9 this AM, consider transfusion with HD      HTN  - continue clonidine, lopressor, hydralazine      HLD   - 2012 lipid panel WNL  pending repeat  - continue simvastatin     PAD s/p PTA  - 2/2012 US LE Arterial with R mid SFA, regions in popliteal artery/anterior tibial, L SFA  - Continue ASA  counseled patient on smoking cessation     F:   E:    N: NPO  PPx: heparin   Dispo: pending evaluation     Luis A Almeida  [facility] Internal Medicine HO-I  [facility] Internal Medicine Service Team A    [facility] [place] [facility] Hospitalist Pager Numbers:   U Hospitalist Medicine Team A (Juan Daniel/Tobias): [phone]  U Hospitalist Medicine Team B (Arnie/Michelle):  [phone]

## 2017-05-10 PROBLEM — N40.0 BENIGN PROSTATIC HYPERPLASIA: Status: ACTIVE | Noted: 2017-05-10

## 2017-05-10 PROBLEM — I25.10 CORONARY ARTERY DISEASE INVOLVING NATIVE CORONARY ARTERY WITHOUT ANGINA PECTORIS: Status: ACTIVE | Noted: 2017-05-10

## 2017-05-10 PROBLEM — E55.9 VITAMIN D DEFICIENCY: Status: ACTIVE | Noted: 2017-05-10

## 2017-05-10 LAB
ALBUMIN SERPL BCP-MCNC: 2.4 G/DL
ALP SERPL-CCNC: 111 U/L
ALT SERPL W/O P-5'-P-CCNC: 11 U/L
ANION GAP SERPL CALC-SCNC: 15 MMOL/L
AST SERPL-CCNC: 26 U/L
BASOPHILS # BLD AUTO: 0.01 K/UL
BASOPHILS NFR BLD: 0.2 %
BILIRUB SERPL-MCNC: 0.7 MG/DL
BLD PROD TYP BPU: NORMAL
BLOOD UNIT EXPIRATION DATE: NORMAL
BLOOD UNIT TYPE CODE: 5100
BLOOD UNIT TYPE: NORMAL
BUN SERPL-MCNC: 51 MG/DL
CALCIUM SERPL-MCNC: 5.7 MG/DL
CHLORIDE SERPL-SCNC: 99 MMOL/L
CK SERPL-CCNC: 961 U/L
CO2 SERPL-SCNC: 21 MMOL/L
CODING SYSTEM: NORMAL
CREAT SERPL-MCNC: 9.7 MG/DL
DIFFERENTIAL METHOD: ABNORMAL
DISPENSE STATUS: NORMAL
EOSINOPHIL # BLD AUTO: 0 K/UL
EOSINOPHIL NFR BLD: 0.2 %
ERYTHROCYTE [DISTWIDTH] IN BLOOD BY AUTOMATED COUNT: 16.4 %
EST. GFR  (AFRICAN AMERICAN): 6 ML/MIN/1.73 M^2
EST. GFR  (NON AFRICAN AMERICAN): 5 ML/MIN/1.73 M^2
GLUCOSE SERPL-MCNC: 132 MG/DL
HCT VFR BLD AUTO: 19.7 %
HGB BLD-MCNC: 7 G/DL
LYMPHOCYTES # BLD AUTO: 0.7 K/UL
LYMPHOCYTES NFR BLD: 12.8 %
MAGNESIUM SERPL-MCNC: 1.7 MG/DL
MCH RBC QN AUTO: 28.7 PG
MCHC RBC AUTO-ENTMCNC: 35.5 %
MCV RBC AUTO: 81 FL
MONOCYTES # BLD AUTO: 0.5 K/UL
MONOCYTES NFR BLD: 10 %
NEUTROPHILS # BLD AUTO: 4 K/UL
NEUTROPHILS NFR BLD: 76 %
PHOSPHATE SERPL-MCNC: 6.8 MG/DL
PLATELET # BLD AUTO: 86 K/UL
PLATELET BLD QL SMEAR: ABNORMAL
PMV BLD AUTO: ABNORMAL FL
POCT GLUCOSE: 150 MG/DL (ref 70–110)
POCT GLUCOSE: 152 MG/DL (ref 70–110)
POCT GLUCOSE: 177 MG/DL (ref 70–110)
POCT GLUCOSE: 194 MG/DL (ref 70–110)
POTASSIUM SERPL-SCNC: 4.2 MMOL/L
PROT SERPL-MCNC: 6.7 G/DL
RBC # BLD AUTO: 2.44 M/UL
SODIUM SERPL-SCNC: 135 MMOL/L
TRANS ERYTHROCYTES VOL PATIENT: NORMAL ML
WBC # BLD AUTO: 5.31 K/UL

## 2017-05-10 PROCEDURE — 94761 N-INVAS EAR/PLS OXIMETRY MLT: CPT

## 2017-05-10 PROCEDURE — 25000003 PHARM REV CODE 250: Performed by: INTERNAL MEDICINE

## 2017-05-10 PROCEDURE — G8979 MOBILITY GOAL STATUS: HCPCS | Mod: CJ

## 2017-05-10 PROCEDURE — 80053 COMPREHEN METABOLIC PANEL: CPT

## 2017-05-10 PROCEDURE — 84100 ASSAY OF PHOSPHORUS: CPT

## 2017-05-10 PROCEDURE — 11000001 HC ACUTE MED/SURG PRIVATE ROOM

## 2017-05-10 PROCEDURE — 25000003 PHARM REV CODE 250: Performed by: STUDENT IN AN ORGANIZED HEALTH CARE EDUCATION/TRAINING PROGRAM

## 2017-05-10 PROCEDURE — 63600175 PHARM REV CODE 636 W HCPCS: Performed by: INTERNAL MEDICINE

## 2017-05-10 PROCEDURE — 85025 COMPLETE CBC W/AUTO DIFF WBC: CPT

## 2017-05-10 PROCEDURE — 83735 ASSAY OF MAGNESIUM: CPT

## 2017-05-10 PROCEDURE — G8987 SELF CARE CURRENT STATUS: HCPCS | Mod: CK

## 2017-05-10 PROCEDURE — 94640 AIRWAY INHALATION TREATMENT: CPT

## 2017-05-10 PROCEDURE — 25000242 PHARM REV CODE 250 ALT 637 W/ HCPCS: Performed by: STUDENT IN AN ORGANIZED HEALTH CARE EDUCATION/TRAINING PROGRAM

## 2017-05-10 PROCEDURE — P9021 RED BLOOD CELLS UNIT: HCPCS

## 2017-05-10 PROCEDURE — 97161 PT EVAL LOW COMPLEX 20 MIN: CPT

## 2017-05-10 PROCEDURE — 36430 TRANSFUSION BLD/BLD COMPNT: CPT

## 2017-05-10 PROCEDURE — 90935 HEMODIALYSIS ONE EVALUATION: CPT

## 2017-05-10 PROCEDURE — G8988 SELF CARE GOAL STATUS: HCPCS | Mod: CJ

## 2017-05-10 PROCEDURE — 97165 OT EVAL LOW COMPLEX 30 MIN: CPT

## 2017-05-10 PROCEDURE — 82550 ASSAY OF CK (CPK): CPT

## 2017-05-10 PROCEDURE — G8978 MOBILITY CURRENT STATUS: HCPCS | Mod: CK

## 2017-05-10 RX ORDER — ALBUTEROL SULFATE 0.83 MG/ML
10 SOLUTION RESPIRATORY (INHALATION) ONCE
Status: COMPLETED | OUTPATIENT
Start: 2017-05-10 | End: 2017-05-10

## 2017-05-10 RX ORDER — HEPARIN SODIUM 1000 [USP'U]/ML
4100 INJECTION, SOLUTION INTRAVENOUS; SUBCUTANEOUS
Status: DISCONTINUED | OUTPATIENT
Start: 2017-05-10 | End: 2017-05-12

## 2017-05-10 RX ORDER — RAMELTEON 8 MG/1
8 TABLET ORAL ONCE
Status: COMPLETED | OUTPATIENT
Start: 2017-05-10 | End: 2017-05-10

## 2017-05-10 RX ORDER — CALCIUM CARBONATE 200(500)MG
1000 TABLET,CHEWABLE ORAL NIGHTLY
Status: DISCONTINUED | OUTPATIENT
Start: 2017-05-10 | End: 2017-05-12

## 2017-05-10 RX ORDER — CALCIUM CARBONATE 200(500)MG
500 TABLET,CHEWABLE ORAL
Status: DISCONTINUED | OUTPATIENT
Start: 2017-05-10 | End: 2017-05-12

## 2017-05-10 RX ORDER — RAMELTEON 8 MG/1
8 TABLET ORAL NIGHTLY PRN
Status: DISCONTINUED | OUTPATIENT
Start: 2017-05-10 | End: 2017-05-10

## 2017-05-10 RX ORDER — ERGOCALCIFEROL 1.25 MG/1
50000 CAPSULE ORAL
Status: DISCONTINUED | OUTPATIENT
Start: 2017-05-10 | End: 2017-05-13 | Stop reason: HOSPADM

## 2017-05-10 RX ADMIN — HYDRALAZINE HYDROCHLORIDE 50 MG: 25 TABLET, FILM COATED ORAL at 01:05

## 2017-05-10 RX ADMIN — ALBUTEROL SULFATE 10 MG: 2.5 SOLUTION RESPIRATORY (INHALATION) at 07:05

## 2017-05-10 RX ADMIN — ERYTHROPOIETIN 10000 UNITS: 10000 INJECTION, SOLUTION INTRAVENOUS; SUBCUTANEOUS at 10:05

## 2017-05-10 RX ADMIN — RAMELTEON 8 MG: 8 TABLET, FILM COATED ORAL at 09:05

## 2017-05-10 RX ADMIN — ASPIRIN 81 MG: 81 TABLET, COATED ORAL at 08:05

## 2017-05-10 RX ADMIN — HEPARIN SODIUM 5000 UNITS: 5000 INJECTION, SOLUTION INTRAVENOUS; SUBCUTANEOUS at 09:05

## 2017-05-10 RX ADMIN — METOPROLOL TARTRATE 100 MG: 50 TABLET ORAL at 08:05

## 2017-05-10 RX ADMIN — HYDRALAZINE HYDROCHLORIDE 10 MG: 20 INJECTION INTRAMUSCULAR; INTRAVENOUS at 04:05

## 2017-05-10 RX ADMIN — SODIUM CHLORIDE, PRESERVATIVE FREE 3 ML: 5 INJECTION INTRAVENOUS at 09:05

## 2017-05-10 RX ADMIN — HYDRALAZINE HYDROCHLORIDE 50 MG: 25 TABLET, FILM COATED ORAL at 09:05

## 2017-05-10 RX ADMIN — CLONIDINE HYDROCHLORIDE 0.3 MG: 0.3 TABLET ORAL at 05:05

## 2017-05-10 RX ADMIN — CLONIDINE HYDROCHLORIDE 0.3 MG: 0.3 TABLET ORAL at 09:05

## 2017-05-10 RX ADMIN — SEVELAMER CARBONATE 800 MG: 800 TABLET, FILM COATED ORAL at 05:05

## 2017-05-10 RX ADMIN — HYDRALAZINE HYDROCHLORIDE 50 MG: 25 TABLET, FILM COATED ORAL at 05:05

## 2017-05-10 RX ADMIN — HEPARIN SODIUM 5000 UNITS: 5000 INJECTION, SOLUTION INTRAVENOUS; SUBCUTANEOUS at 02:05

## 2017-05-10 RX ADMIN — CALCIUM GLUCONATE 1000 MG: 94 INJECTION, SOLUTION INTRAVENOUS at 05:05

## 2017-05-10 RX ADMIN — CLONIDINE HYDROCHLORIDE 0.3 MG: 0.3 TABLET ORAL at 01:05

## 2017-05-10 RX ADMIN — HEPARIN SODIUM 5000 UNITS: 5000 INJECTION, SOLUTION INTRAVENOUS; SUBCUTANEOUS at 05:05

## 2017-05-10 RX ADMIN — SODIUM CHLORIDE, PRESERVATIVE FREE 3 ML: 5 INJECTION INTRAVENOUS at 02:05

## 2017-05-10 RX ADMIN — ESCITALOPRAM 20 MG: 10 TABLET, FILM COATED ORAL at 08:05

## 2017-05-10 RX ADMIN — METOPROLOL TARTRATE 100 MG: 50 TABLET ORAL at 09:05

## 2017-05-10 RX ADMIN — HEPARIN SODIUM 4100 UNITS: 1000 INJECTION, SOLUTION INTRAVENOUS; SUBCUTANEOUS at 10:05

## 2017-05-10 RX ADMIN — CALCIUM CARBONATE (ANTACID) CHEW TAB 500 MG 1000 MG: 500 CHEW TAB at 09:05

## 2017-05-10 RX ADMIN — SEVELAMER CARBONATE 800 MG: 800 TABLET, FILM COATED ORAL at 08:05

## 2017-05-10 RX ADMIN — SIMVASTATIN 20 MG: 20 TABLET, FILM COATED ORAL at 09:05

## 2017-05-10 RX ADMIN — ERGOCALCIFEROL 50000 UNITS: 1.25 CAPSULE, LIQUID FILLED ORAL at 08:05

## 2017-05-10 RX ADMIN — CALCIUM CARBONATE (ANTACID) CHEW TAB 500 MG 500 MG: 500 CHEW TAB at 05:05

## 2017-05-10 RX ADMIN — SODIUM CHLORIDE, PRESERVATIVE FREE 3 ML: 5 INJECTION INTRAVENOUS at 05:05

## 2017-05-10 NOTE — PROGRESS NOTES
Progress Note  Nephrology      Consult Requested By: Lea Frances MD      SUBJECTIVE:     Overnight events  Patient is a 72 y.o. male     Patient Active Problem List   Diagnosis    Renal failure    DM (diabetes mellitus) type II controlled, neurological manifestation    BPH (benign prostatic hyperplasia)    CAD (coronary artery disease)    CKD (chronic kidney disease) stage 4, GFR 15-29 ml/min    S/P CABG (coronary artery bypass graft)    DM (diabetes mellitus), type 2 with renal complications    Essential hypertension    Coronary artery disease involving coronary bypass graft of native heart without angina pectoris    Type 2 diabetes mellitus with diabetic nephropathy    Hyperglycemia    Fall    PVD (peripheral vascular disease)    Noncompliance    Renal failure (ARF), acute on chronic    Acute hyperkalemia    Hypocalcemia    Metabolic acidosis    Vitamin D deficiency     Past Medical History:   Diagnosis Date    Blind left eye     BPH (benign prostatic hyperplasia)     CHF (congestive heart failure)     COPD (chronic obstructive pulmonary disease)     Diabetes mellitus type I     Hyperlipidemia     Hypertension     Neuropathy     Prosthetic eye globe     PVD (peripheral vascular disease)               OBJECTIVE:     Vitals:    05/10/17 0930 05/10/17 0945 05/10/17 1000 05/10/17 1015   BP: (!) 130/56 (!) 144/63 (!) 112/52 136/68   BP Location: Right arm  Right arm    Patient Position: Lying  Lying    BP Method: Automatic  Automatic    Pulse: 62 61 62 61   Resp:  18 18 18   Temp:  97.9 °F (36.6 °C) 97.9 °F (36.6 °C) 98 °F (36.7 °C)   TempSrc:  Oral Oral Oral   SpO2:       Weight:       Height:           Temp: 98 °F (36.7 °C) (05/10/17 1015)  Pulse: 61 (05/10/17 1015)  Resp: 18 (05/10/17 1015)  BP: 136/68 (05/10/17 1015)  SpO2: 96 % (05/10/17 0234)      Date 05/10/17 0700 - 05/11/17 0659   Shift 9424-1128 5528-9234 0726-0297 24 Hour Total   I  N  T  A  K  E   Blood 248   248    Shift  Total  (mL/kg) 248  (2.8)   248  (2.8)   O  U  T  P  U  T   Shift Total  (mL/kg)       Weight (kg) 89.9 89.9 89.9 89.9             Medications:   sodium chloride 0.9%   Intravenous Once    aspirin  81 mg Oral Daily    cloNIDine  0.3 mg Oral TID    ergocalciferol  50,000 Units Oral Q7 Days    escitalopram oxalate  20 mg Oral Daily    heparin (porcine)  5,000 Units Subcutaneous Q8H    hydrALAZINE  50 mg Oral Q8H    metoprolol tartrate  100 mg Oral BID    sevelamer carbonate  800 mg Oral TID WM    simvastatin  20 mg Oral QHS    sodium chloride 0.9%  3 mL Intravenous Q8H        Physical Exam:  General appearance: NAD  No SOB  Lungs: diminished breath sounds  Heart: Pulse 62  /68  Abdomen: soft  Extremities: edema  Skin: dry  Laboratory:  ABG  Labs reviewed  No results found for this or any previous visit (from the past 336 hour(s)).  Recent Results (from the past 336 hour(s))   CBC auto differential    Collection Time: 05/10/17  3:02 AM   Result Value Ref Range    WBC 5.31 3.90 - 12.70 K/uL    Hemoglobin 7.0 (L) 14.0 - 18.0 g/dL    Hematocrit 19.7 (LL) 40.0 - 54.0 %    Platelets 86 (L) 150 - 350 K/uL   CBC auto differential    Collection Time: 05/09/17  4:23 AM   Result Value Ref Range    WBC 5.83 3.90 - 12.70 K/uL    Hemoglobin 6.9 (L) 14.0 - 18.0 g/dL    Hematocrit 19.3 (LL) 40.0 - 54.0 %    Platelets 94 (L) 150 - 350 K/uL   CBC auto differential    Collection Time: 05/08/17  2:13 PM   Result Value Ref Range    WBC 7.24 3.90 - 12.70 K/uL    Hemoglobin 7.8 (L) 14.0 - 18.0 g/dL    Hematocrit 22.8 (L) 40.0 - 54.0 %    Platelets 105 (L) 150 - 350 K/uL     Urinalysis  No results for input(s): COLORU, CLARITYU, SPECGRAV, PHUR, PROTEINUA, GLUCOSEU, BILIRUBINCON, BLOODU, WBCU, RBCU, BACTERIA, MUCUS, NITRITE, LEUKOCYTESUR, UROBILINOGEN, HYALINECASTS in the last 24 hours.    Diagnostic Results:  X-Ray: Reviewed  US: Reviewed  Echo: Reviewed  ACCESS    ASSESSMENT/PLAN:   ESRD  Metabolic bone  disease  Anemia  Transfuse PRBC with HD  Poor nutrition  /68  Hemodialysis in progress

## 2017-05-10 NOTE — PLAN OF CARE
Problem: Occupational Therapy Goal  Goal: Occupational Therapy Goal  Goals to be met by: 6/10/17     Patient will increase functional independence with ADLs by performing:    LE Dressing with Stand-by Assistance.  Grooming while standing with Stand-by Assistance.  Toileting from toilet with Stand-by Assistance for hygiene and clothing management.   Supine to sit with Stand-by Assistance.  Stand pivot transfers with Stand-by Assistance.  Toilet transfer to toilet with Stand-by Assistance.  Increased functional strength to WFL for self care skills and functional mobility.  Upper extremity exercise program x10 reps per handout, with independence.  Outcome: Ongoing (interventions implemented as appropriate)  Pt would benefit from cont OT services in order to maximize functional independence. Recommending SNF at d/c

## 2017-05-10 NOTE — PLAN OF CARE
TN went to meet with patient.  Patient off floor in HD.  Per TN notes, outpatient dialysis clinical information [facility]ed [facility].  TN will follow-up.    --Per MD note: Jonas cath placed 5/9, vein mapping for fistula pending    --Update: TN met with patient's family. Family updated on outpatient dialysis referral sent. TN updated family, family inquiring about transport to dialysis. Family states patient is independent and usually drives himself, but unsure since he is in the hospital. TN called [facility] stated they do not provide transport. [facility] to call patient's insurance to discuss transportation options. TN also updated MD team.     05/10/17 1012   Discharge Reassessment   Assessment Type Discharge Planning Reassessment     Jaylene Dong RN  Transition Navigator  [phone]

## 2017-05-10 NOTE — PROGRESS NOTES
SSC placed call to [facility] [phone] ext [phone] to confirm receipt of clinicals. She has all necessary documents needed and has sent everything to the [place] facility. Patient's chair time is tentatively MWF- 3rd shift. Once insurance has been verified and the facility has approved the chair time, patient will be accepted. [facility] provided [place] with TN's contact info to update her in the event SSC is unable to reach.

## 2017-05-10 NOTE — PROGRESS NOTES
SSC placed call to Juana, onsite Nurse Liason for [employer] to inquire about transportation benefit.[employer] does have a transportation benefit that needs to be setup 3 days prior to his first dialysis session. She informed SSC that it is normally like 20 visits that gets authorized ( each trip is counted as one, so in reality it is only 10 visits). She informed SSC she would go and talk to the patient and explain to him in detail he need to contact member services to initiate his transportation.

## 2017-05-10 NOTE — PROGRESS NOTES
Occupational Therapy   Evaluation Attempt      Tommie Salmeron   MRN: [id number]       Pt unavailable at this time- SHELBY in HD.       Estelle Meehan, OT  5/10/2017

## 2017-05-10 NOTE — PLAN OF CARE
[facility] Internal Medicine Plan of Care Note    Primary team spoke to patient and family (brother and sister) regarding patient's plan for dialysis. Communicated about risks if patient refuses dialysis and adherence to dialysis schedule per nephrologist recommendations. Patient expresses understanding and that he wishes to move forward with getting permanent access for future HD and outpatient dialysis. Family agrees that if insurance does not approve for transportation to dialysis centers, family will provide transportation for dialysis as scheduled. Informed that patient is planned for fistula surgery this Friday.    Luis A Almeida, PGY-I  [facility] Internal Medicine

## 2017-05-10 NOTE — PT/OT/SLP EVAL
Physical Therapy  Evaluation    Tommie Salmeron   MRN: [id number]   Admitting Diagnosis: Renal failure    PT Received On: 05/10/17  PT Start Time: 1200     PT Stop Time: 1211    PT Total Time (min): 11 min       Billable Minutes:  Evaluation 11    Diagnosis: Renal failure      Past Medical History:   Diagnosis Date    Blind left eye     BPH (benign prostatic hyperplasia)     CHF (congestive heart failure)     COPD (chronic obstructive pulmonary disease)     Diabetes mellitus type I     Hyperlipidemia     Hypertension     Neuropathy     Prosthetic eye globe     PVD (peripheral vascular disease)       Past Surgical History:   Procedure Laterality Date    COLONOSCOPY  2009    CORONARY ARTERY BYPASS GRAFT      fem/popl revasc w/stent            General Precautions: Standard, fall  Orthopedic Precautions: N/A   Braces: N/A       Do you have any cultural, spiritual, Alevism conflicts, given your current situation?: none    Patient History:  Lives With: alone  Living Arrangements: assisted living  Home Accessibility:  (no concerns)  Transportation Available: family or friend will provide  Equipment Currently Used at Home: glucometer, walker, rolling  DME owned (not currently used): none    Previous Level of Function:  Ambulation Skills: needs device  Transfer Skills: needs device    Subjective:  Communicated with nsg prior to session.  Pt agreeable to eval  Chief Complaint: dizziness and nausea with sitting at EOB  Patient goals: pt kristina not state    Pain Ratin/10               Pain Rating Post-Intervention: 0/10    Objective:         Cognitive Exam:  Oriented to: Person and Place    Follows Commands/attention: Follows one-step commands  Communication: clear/fluent, slow responses  Safety awareness/insight to disability: impaired    Physical Exam:  Postural examination/scapula alignment: Rounded shoulder and Head forward    Skin integrity: Visible skin intact  Edema: None noted     Sensation:    N/T    Upper Extremity Range of Motion:    Lower Extremity Range of Motion:  Right Lower Extremity: WFL  Left Lower Extremity: WFL    Lower Extremity Strength:  Right Lower Extremity: WFL  Left Lower Extremity: WFL     Fine motor coordination:  N/T    Gross motor coordination: impaired 2/2 weakness and deconditioning    Functional Mobility:  Bed Mobility:  Scooting/Bridging: Stand by Assistance  Supine to Sit: Stand by Assistance  Sit to Supine: Stand by Assistance    Transfers:  Sit <> Stand Assistance: Moderate Assistance  Sit <> Stand Assistive Device: No Assistive Device    Gait:   Gait Distance: 3 SS at EOB  Assistance 1: Moderate assistance  Gait Assistive Device: No device, Hand held assist  Gait Pattern: 2-point gait  Gait Deviation(s): decreased gabriel, increased time in double stance, decreased step length, decreased weight-shifting ability, decreased toe-to-floor clearance    Stairs:  N/T    Balance:   Static Sit: FAIR: Maintains without assist, but unable to take any challenges   Dynamic Sit: FAIR: Cannot move trunk without losing balance  Static Stand: POOR: Needs MODERATE assist to maintain  Dynamic stand: POOR: N/A    Therapeutic Activities and Exercises:  Limited functional eval only 2/2 dizziness and nausea    AM-PAC 6 CLICK MOBILITY  How much help from another person does this patient currently need?   1 = Unable, Total/Dependent Assistance  2 = A lot, Maximum/Moderate Assistance  3 = A little, Minimum/Contact Guard/Supervision  4 = None, Modified [place]/Independent    Turning over in bed (including adjusting bedclothes, sheets and blankets)?: 4  Sitting down on and standing up from a chair with arms (e.g., wheelchair, bedside commode, etc.): 2  Moving from lying on back to sitting on the side of the bed?: 4  Moving to and from a bed to a chair (including a wheelchair)?: 2  Need to walk in hospital room?: 2  Climbing 3-5 steps with a railing?: 1  Total Score: 15     AM-PAC Raw Score CMS  G-Code Modifier Level of Impairment Assistance   6 % Total / Unable   7 - 9 CM 80 - 100% Maximal Assist   10 - 14 CL 60 - 80% Moderate Assist   15 - 19 CK 40 - 60% Moderate Assist   20 - 22 CJ 20 - 40% Minimal Assist   23 CI 1-20% SBA / CGA   24 CH 0% Independent/ Mod I     Patient left right sidelying with call button in reach, bed alarm on and nsg notified.    Assessment:   Tommie Salmeron is a 72 y.o. male with a medical diagnosis of Renal failure and presents with decreased functional independence 2/2 deconditioning and weakness.  Pt could benefit from skilled PT services to address deficits below in order to maximize function prior to D/C.    Rehab identified problem list/impairments: Rehab identified problem list/impairments: weakness, impaired endurance, impaired self care skills, impaired functional mobilty, decreased coordination, impaired cognition, impaired balance, gait instability, decreased safety awareness, impaired coordination    Rehab potential is good.    Activity tolerance: Poor    Discharge recommendations: Discharge Facility/Level Of Care Needs: nursing facility, skilled     Barriers to discharge: Barriers to Discharge: Decreased caregiver support    Equipment recommendations: Equipment Needed After Discharge: none     GOALS:   Physical Therapy Goals        Problem: Physical Therapy Goal    Goal Priority Disciplines Outcome Goal Variances Interventions   Physical Therapy Goal     PT/OT, PT Ongoing (interventions implemented as appropriate)     Description:  Goals to be met by: 2017     Patient will increase functional independence with mobility by performin. Supine to sit with Stand-by Assistance  2. Sit to stand transfer with Stand-by Assistance  3. Gait  x 100 feet with Stand-by Assistance using Rolling Walker.                 PLAN:    Patient to be seen 6 x/week to address the above listed problems via gait training, therapeutic exercises, therapeutic activities,  neuromuscular re-education  Plan of Care expires: 06/07/17  Plan of Care reviewed with: patient    Functional Assessment Tool Used: AM-PAC  Score: 15  Functional Limitation: Mobility: Walking and moving around  Mobility: Walking and Moving Around Current Status (): CK  Mobility: Walking and Moving Around Goal Status (): ADRIENNE Khan, PT  05/10/2017

## 2017-05-10 NOTE — PLAN OF CARE
TN again met with family. Discussed transport options, patient's family willing to bring patient for dialysis if transport not available.     --Family informed [employer] rep to speak with them tomorrow regarding transportation service.    --Refer to previous note regarding transport (from )    --Family also requesting home health on discharge. States patient has been with [facility] in the past.    --Update: TN met with family, understands therapy recommended SNF on discharge. Family in room given SNF choices and education. They will review and discuss discharge plan. They would still like home health with therapy as an option as well.      05/10/17 1333   Discharge Reassessment   Assessment Type Discharge Planning Reassessment     Jaylene Dong RN  Transition Navigator  [phone]

## 2017-05-10 NOTE — OP NOTE
DATE OF PROCEDURE:  05/09/2017    PREOPERATIVE DIAGNOSES:  Chronic renal insufficiency with acute kidney injury,   most likely will be an end-stage renal disease requiring long-term dialysis.    POSTOPERATIVE DIAGNOSES:  Chronic renal insufficiency with acute kidney injury,   most likely will be an end-stage renal disease requiring long-term dialysis.    PROCEDURE:  1.  Neck ultrasound.  2.  Fluoroscopy less than 1 hour.  3.  Right internal jugular access, 14.5-Irish tunneled dialysis catheter   placement through the right internal jugular access.    ANESTHESIA:  Done under general anesthesia.    SURGEON:  Davin Rodriguez M.D.    SURGERY RESIDENT:  Gayle Cummings M.D., (RES) and Marco Rivero M.D.,   (RES).    BLOOD LOSS:  Minimal.    COMPLICATIONS:  None.    SPECIMEN:  None.    HISTORY AND INDICATION:  This is a 72-year-old gentleman who came to the   Emergency Room yesterday with shortness of breath.  He was found to have a   significantly elevated creatinine and requiring dialysis.  He had a groin   catheter placed for immediate hemodialysis.  Since he has end-stage component,   it was decided he would have a Perm-A-Cath.  This was discussed with the patient   and the family and a consent was obtained.    PROCEDURE IN DETAIL:  The patient was brought to the Operating Room, was placed   in supine position.  He was then sedated and intubated.  The right side of the   neck and the chest was prepped and draped in the usual sterile manner.  An   ultrasound was done.  Ultrasound showed a patent internal jugular vein.    Following this, 1% Xylocaine was infiltrated over the neck and over the chest   area.  With a 14-gauge needle, internal jugular access was accomplished.    Through the needle, a guidewire was inserted and the position was verified using   fluoroscopy.    Following this, an exit site was chosen below the clavicle.  Adequate local was   given between the entrance and the exit site.  A tunnel  was made between the   entrance and the exit site and the proximal tip of the dialysis catheter was   brought through the tunnel.  Following this, a series of dilators were passed   over the guidewire followed by the final placement of the largest dilator with   the sheath.  Following this, the dilator and the guidewire was taken out.  The   proximal tip of the hemodialysis catheter was inserted through the sheath all   the way until it was at the junction of right atrium and SVC.  Following that,   the sheath was peeled off.  Blood was withdrawn through both the ports without   any problem.  Heparin saline followed by straight heparin was infused back   through both the ports.  The position of the catheter was once again checked   with fluoroscopy.  Following this, the entrance site was closed using 4-0   Monocryl.  The exit site was closed around the catheter in a pursestring fashion   using 2-0 nylon and was secured to the skin using 2-0 nylon.  Proper dressings   were placed.  A stat chest x-ray will be obtained.      SYBIL  dd: 05/09/2017 13:50:05 (CDT)  td: 05/09/2017 21:48:53 (CDT)  Doc ID   #[id number]  Job ID #[id number]    CC:

## 2017-05-10 NOTE — PHYSICIAN QUERY
PT Name: Tommie Salmeron  MR #: [id number]     Physician Query Form - Documentation Clarification      CDS/: Lia Andrea               Contact information:[email or web address]    This form is a permanent document in the medical record.     Query Date: May 10, 2017    By submitting this query, we are merely seeking further clarification of documentation. Please utilize your independent clinical judgment when addressing the question(s) below.    The Medical record reflects the following:    Supporting Clinical Findings Location in Medical Record     Magnesium = 1.3    Replace magnesium, calcium   Labs 5/9    Nephrology PN 5/9     Magnesium Sulfate IV       MAR 5/9                                                                            Doctor, Please specify diagnosis or diagnoses associated with above clinical findings.    Provider Use Only      [  x]  Hypomagnesemia    [   ]  Other explanations with details______________________________________                                                                                                                   [  ] Clinically undetermined

## 2017-05-10 NOTE — PROGRESS NOTES
[facility] sent clinicals to [facility] Admissions for new HD set up [phone] for ([place]) location.      10:50 SSC placed call to [facility] ([phone]), spoke with Tiny to verify fax number. SSC sent clinicals to [facility] location ( [phone]) for new HD set up in addition to Admissions dept.

## 2017-05-10 NOTE — PLAN OF CARE
Problem: Physical Therapy Goal  Goal: Physical Therapy Goal  Goals to be met by: 2017     Patient will increase functional independence with mobility by performin. Supine to sit with Stand-by Assistance  2. Sit to stand transfer with Stand-by Assistance  3. Gait x 100 feet with Stand-by Assistance using Rolling Walker.    Outcome: Ongoing (interventions implemented as appropriate)  Initial PT evaluation performed.  Pt could benefit from skilled PT services 6x/wk in order to maximize function prior to D/C

## 2017-05-10 NOTE — PROGRESS NOTES
"[facility] Internal Medicine Resident HO-I Progress Note    Subjective:      Patient reports "feeling pretty good man." Endorses some L hip pain. Per nurse report, patient has been laying and sleeping mainly on left side. Denies fevers, chills, N/V, SOB, CP, abdominal pain.      Objective:     Last 24 Hour Vital Signs:  BP  Min: 123/72  Max: 224/93  Temp  Av.1 °F (36.7 °C)  Min: 97.6 °F (36.4 °C)  Max: 98.8 °F (37.1 °C)  Pulse  Av.5  Min: 59  Max: 70  Resp  Av.6  Min: 11  Max: 25  SpO2  Av.5 %  Min: 95 %  Max: 100 %  I/O last 3 completed shifts:  In: 1300 [P.O.:100; I.V.:100; Other:1000; IV Piggyback:100]  Out: 0 [Other:]    Physical Examination:  /76 (BP Location: Right arm, Patient Position: Lying, BP Method: Automatic)  Pulse 69  Temp 97.8 °F (36.6 °C) (Axillary)   Resp 18  Ht 5' 9" (1.753 m)  Wt 89.9 kg (198 lb 3.1 oz)  SpO2 96%  BMI 29.27 kg/m2    General: alert, cooperative, and no acute distress  Head: Normocephalic, without obvious abnormality, atraumatic  Eyes: conjunctivae/corneas clear; PERRLA, EOM's intact, L eye blind s/p prosthesis  Nose: Nares normal; septum midline; mucosa normal; no drainage or sinus tenderness  Throat: lips, mucosa, and tongue normal; teeth and gums normal  Neck: no adenopathy, supple, symmetrical, trachea midline and thyroid not enlarged, symmetric, no tenderness/mass/nodules  Lung: R permacath in place, clear to auscultation throughout; no increased work of breathing  Heart: regular rate and rhythm, no M/R/G, normal S1/S2.  Abdomen: soft, non-tender; bowel sounds normal; no masses, no organomegaly  Extremities: minimal ankle, no cyanosis   Pulses: 2+ and symmetric  Skin: Skin color, texture, turgor normal; no rashes or lesions  Neuro: A&Ox3, 5/5 motor strength to UE, 4/5 to LE, normal sensation light touch b/l       Laboratory:  Laboratory Data Reviewed: yes  Recent Labs      17   1413  17   0423  05/10/17   0302   WBC  7.24  5.83  " 5.31   HGB  7.8*  6.9*  7.0*   HCT  22.8*  19.3*  19.7*   PLT  105*  94*  86*   MCV  84  80*  81*   RDW  15.2*  16.6*  16.4*   GRAN  69.7  5.1  75.1*  4.4  76.0*  4.0   LYMPH  14.4*  1.0  12.0*  0.7*  12.8*  0.7*   MONO  15.2*  1.1*  11.7  0.7  10.0  0.5   EOS  0.0  0.0  0.0   BASO  0.02  0.00  0.01   EOSINOPHIL  0.4  0.3  0.2   BASOPHIL  0.3  0.0  0.2       Recent Labs      05/08/17   1825  05/08/17   2355  05/09/17   0825  05/09/17   1223  05/10/17   0302   NA  137  137  137  137  135*   K  5.5*  4.2  4.2  3.7  4.2   CL  107  103  102  100  99   CO2  12*  15*  20*  24  21*   BUN  115*  79*  81*  44*  51*   CREATININE  18.5*  13.6*  14.2*  8.4*  9.7*   GLU  76  56*  102  102  132*     Recent Labs      05/09/17   0653  05/09/17   1050  05/09/17   1628  05/09/17   2057  05/10/17   0545   POCTGLUCOSE  106  100  167*  156*  150*     Recent Labs      05/08/17   1825  05/08/17   2355  05/09/17   0825  05/09/17   1223  05/10/17   0302   PROT  8.0  7.3  6.8  7.3  6.7   ALBUMIN  3.0*  2.7*  2.5*  2.7*  2.4*   BILITOT  0.6  0.7  0.7  1.0  0.7   AST  32  32  27  29  26   ALT  15  14  12  13  11   ALKPHOS  133  120  121  130  111       Microbiology Data Reviewed: yes  Microbiology Results (last 7 days)     Procedure Component Value Units Date/Time    Urine culture [[id number]] Collected:  05/08/17 1507    Order Status:  Completed Specimen:  Urine Updated:  05/09/17 2343     Urine Culture, Routine No growth    Urine culture [[id number]]     Order Status:  Completed Specimen:  Urine from Urine, Clean Catch     Urine culture [[id number]]     Order Status:  Canceled Specimen:  Urine           Other Results:  EKG (my interpretation): no new     Radiology Data Reviewed: yes  Reviewed    Current Medications:     Infusions:        Scheduled:   sodium chloride 0.9%   Intravenous Once    aspirin  81 mg Oral Daily    cloNIDine  0.3 mg Oral TID    escitalopram oxalate  20 mg Oral Daily    heparin (porcine)  5,000 Units  Subcutaneous Q8H    hydrALAZINE  50 mg Oral Q8H    metoprolol tartrate  100 mg Oral BID    sevelamer carbonate  800 mg Oral TID WM    simvastatin  20 mg Oral QHS    sodium chloride 0.9%  3 mL Intravenous Q8H        PRN:  sodium chloride 0.9%, sodium chloride 0.9%, dextrose 50%, dextrose 50%, glucagon (human recombinant), glucose, glucose, heparin (porcine), hydrALAZINE, insulin aspart    Antibiotics and Day Number of Therapy:  Antibiotics     None          Assessment:     Tommie Salmeron is a 72 y.o.male with:    Plan:     Acute Renal Failure in setting of CKDV (now likely ESRD)  - follows Dr. Phillip  oliguric  has discussion for dialysis with nephrologist but refuses starting HD  - presented with K 5.6, BUN/Cr 113/18.3, Phos 11.5  baseline Cr 5-7  - EKG NSR, LAE, RBBB, LVH, L anterior fasicular block   - s/p HD x 2  Jonas cath placed 5/9   planned for HD today   - continue sevelamer  consulted L-nephro, vein mapping for fistula pending  - consulted CM for outpatient HD      AGMA - resolved  - AG 20 on admit, trending down to normal  likely 2/2 to ARF  - as above     Hyperkalemia/Hyperphosphatemia/Hypocalcemia - improving   - K 5.6, Phos 11.5, Ca 4.8 on admit  likely 2/2 ARF  - continue sevelamer/citrate  s/p HD x2   - electrolytes improving, monitor with BMP and HD    Vitamin D Deficiency  - Ca has been low since admit  Vit D 11  - replaced with 50K U D2  will need 6-8 weeks, then 800U D3 daily after     Hypoglycemia - resolved   - 2 episodes of BG in 30's and received D50, then BG 65 the next day  - BG  on this admission  holding home meds Novolin 10U BID given hypoglycemia  - continue SSI and accuchecks     Fall with mild Rhabdomyolysis   - 1 episode of fall at assisted living  per family, >2hours LOC yesterday  - possibly related to hypoglycemia   - CK 1560  CT head with midline posterior parietal scalp mild soft tissue swelling/contusion near the vertex, without displaced skull  fracture or acute intracranial findings identified     Asymptomatic Bacteruria/Hematuria   - UA with RBC 20, WBC 5, mod bacteria  - Will hold on abx for now  UCx NG     CAD s/p CABG  - continue ASA, simvastatin, Lopressor     Chronic Diastolic Dysfunction  - h/o combined systolic/diastolic HF  - 5/2015 ECHO with 40-45%, DD, mild MR/TR, MARILYN, possible restrictive CM  - CXR Overall grossly stable chronic findings noting possible minimal interstitial edema/congestion without convincing large focal consolidation  - ECHO on this admission EF 60%, Pulm HTN 51mmg Hg, mid TR, no wall motion abnl     Uncontrolled DMII  - A1C 6.9 on this admission    - Home meds NPH 10U BID  will hold home insulin regime due to episodes of hypoglycemia  - SSI and accuchecks for now and adjust as needed     Normocytic Anemia/Thrombocytopenia  - H/H 9.2/26 on admit  baseline Hb 9-10  Plt 105, chronically low in past  - no signs of bleeding  pending Fe study  - Hb stable, consider transfusion with HD      HTN  - continue clonidine, lopressor, hydralazine      HLD   - repeat lipid panel: nasir 93, HDL 17, LDL 52,   - continue simvastatin     PAD s/p PTA  - 2/2012 US LE Arterial with R mid SFA, regions in popliteal artery/anterior tibial, L SFA  - Continue ASA  counseled patient on smoking cessation     F:   E:    N: renal  PPx: heparin   Dispo: pending outpatient HD      Luis A Almeida  U Internal Medicine HO-I  U Internal Medicine Service Team A    [facility] Medicine Hospitalist Pager Numbers:   U Hospitalist Medicine Team A (Juan Daniel/Tobias): [phone]  U Hospitalist Medicine Team B (Arnie/Michelle):  [phone]

## 2017-05-11 PROBLEM — Z51.5 END OF LIFE CARE: Status: ACTIVE | Noted: 2017-05-11

## 2017-05-11 PROBLEM — Z71.89 ENCOUNTER FOR HOSPICE CARE DISCUSSION: Status: ACTIVE | Noted: 2017-05-11

## 2017-05-11 LAB
ALBUMIN SERPL BCP-MCNC: 2.4 G/DL
ALP SERPL-CCNC: 108 U/L
ALT SERPL W/O P-5'-P-CCNC: 7 U/L
ANION GAP SERPL CALC-SCNC: 16 MMOL/L
ANISOCYTOSIS BLD QL SMEAR: SLIGHT
AST SERPL-CCNC: 30 U/L
BASOPHILS # BLD AUTO: 0 K/UL
BASOPHILS NFR BLD: 0 %
BILIRUB SERPL-MCNC: 0.8 MG/DL
BUN SERPL-MCNC: 36 MG/DL
CALCIUM SERPL-MCNC: 6.3 MG/DL
CHLORIDE SERPL-SCNC: 98 MMOL/L
CK SERPL-CCNC: 845 U/L
CO2 SERPL-SCNC: 20 MMOL/L
CREAT SERPL-MCNC: 6.8 MG/DL
DIFFERENTIAL METHOD: ABNORMAL
EOSINOPHIL # BLD AUTO: 0 K/UL
EOSINOPHIL NFR BLD: 0 %
ERYTHROCYTE [DISTWIDTH] IN BLOOD BY AUTOMATED COUNT: 15.6 %
EST. GFR  (AFRICAN AMERICAN): 9 ML/MIN/1.73 M^2
EST. GFR  (NON AFRICAN AMERICAN): 7 ML/MIN/1.73 M^2
GLUCOSE SERPL-MCNC: 180 MG/DL
HCT VFR BLD AUTO: 25.1 %
HEP. B SURF AB, QUAL: NEGATIVE
HEP. B SURF AB, QUANT.: <3 MIU/ML
HGB BLD-MCNC: 8.8 G/DL
HYPOCHROMIA BLD QL SMEAR: ABNORMAL
LYMPHOCYTES # BLD AUTO: 0.8 K/UL
LYMPHOCYTES NFR BLD: 11.9 %
MAGNESIUM SERPL-MCNC: 1.7 MG/DL
MCH RBC QN AUTO: 28.6 PG
MCHC RBC AUTO-ENTMCNC: 35.1 %
MCV RBC AUTO: 82 FL
MONOCYTES # BLD AUTO: 0.4 K/UL
MONOCYTES NFR BLD: 5.8 %
NEUTROPHILS # BLD AUTO: 5.8 K/UL
NEUTROPHILS NFR BLD: 82.3 %
PHOSPHATE SERPL-MCNC: 5.4 MG/DL
PLATELET # BLD AUTO: 116 K/UL
PLATELET BLD QL SMEAR: ABNORMAL
PMV BLD AUTO: 11.5 FL
POCT GLUCOSE: 134 MG/DL (ref 70–110)
POCT GLUCOSE: 186 MG/DL (ref 70–110)
POCT GLUCOSE: 203 MG/DL (ref 70–110)
POCT GLUCOSE: 219 MG/DL (ref 70–110)
POIKILOCYTOSIS BLD QL SMEAR: SLIGHT
POLYCHROMASIA BLD QL SMEAR: ABNORMAL
POTASSIUM SERPL-SCNC: 4.2 MMOL/L
PROT SERPL-MCNC: 6.7 G/DL
RBC # BLD AUTO: 3.08 M/UL
SODIUM SERPL-SCNC: 134 MMOL/L
TARGETS BLD QL SMEAR: ABNORMAL
WBC # BLD AUTO: 7.05 K/UL

## 2017-05-11 PROCEDURE — 11000001 HC ACUTE MED/SURG PRIVATE ROOM

## 2017-05-11 PROCEDURE — 63600175 PHARM REV CODE 636 W HCPCS: Performed by: STUDENT IN AN ORGANIZED HEALTH CARE EDUCATION/TRAINING PROGRAM

## 2017-05-11 PROCEDURE — 25000003 PHARM REV CODE 250: Performed by: INTERNAL MEDICINE

## 2017-05-11 PROCEDURE — 82550 ASSAY OF CK (CPK): CPT

## 2017-05-11 PROCEDURE — 99223 1ST HOSP IP/OBS HIGH 75: CPT | Mod: ,,, | Performed by: FAMILY MEDICINE

## 2017-05-11 PROCEDURE — 80053 COMPREHEN METABOLIC PANEL: CPT

## 2017-05-11 PROCEDURE — 84100 ASSAY OF PHOSPHORUS: CPT

## 2017-05-11 PROCEDURE — 85025 COMPLETE CBC W/AUTO DIFF WBC: CPT

## 2017-05-11 PROCEDURE — 36415 COLL VENOUS BLD VENIPUNCTURE: CPT

## 2017-05-11 PROCEDURE — 83735 ASSAY OF MAGNESIUM: CPT

## 2017-05-11 PROCEDURE — 94761 N-INVAS EAR/PLS OXIMETRY MLT: CPT

## 2017-05-11 RX ORDER — SODIUM CITRATE AND CITRIC ACID MONOHYDRATE 334; 500 MG/5ML; MG/5ML
30 SOLUTION ORAL 3 TIMES DAILY
Status: DISCONTINUED | OUTPATIENT
Start: 2017-05-11 | End: 2017-05-12

## 2017-05-11 RX ADMIN — SODIUM CITRATE AND CITRIC ACID MONOHYDRATE 30 ML: 500; 334 SOLUTION ORAL at 01:05

## 2017-05-11 RX ADMIN — HYDRALAZINE HYDROCHLORIDE 50 MG: 25 TABLET, FILM COATED ORAL at 10:05

## 2017-05-11 RX ADMIN — CLONIDINE HYDROCHLORIDE 0.3 MG: 0.3 TABLET ORAL at 10:05

## 2017-05-11 RX ADMIN — ESCITALOPRAM 20 MG: 10 TABLET, FILM COATED ORAL at 09:05

## 2017-05-11 RX ADMIN — SODIUM CITRATE AND CITRIC ACID MONOHYDRATE 30 ML: 500; 334 SOLUTION ORAL at 10:05

## 2017-05-11 RX ADMIN — CALCIUM CARBONATE (ANTACID) CHEW TAB 500 MG 500 MG: 500 CHEW TAB at 09:05

## 2017-05-11 RX ADMIN — SODIUM CHLORIDE, PRESERVATIVE FREE 3 ML: 5 INJECTION INTRAVENOUS at 06:05

## 2017-05-11 RX ADMIN — CLONIDINE HYDROCHLORIDE 0.3 MG: 0.3 TABLET ORAL at 04:05

## 2017-05-11 RX ADMIN — SIMVASTATIN 20 MG: 20 TABLET, FILM COATED ORAL at 10:05

## 2017-05-11 RX ADMIN — SODIUM CHLORIDE, PRESERVATIVE FREE 3 ML: 5 INJECTION INTRAVENOUS at 01:05

## 2017-05-11 RX ADMIN — HYDRALAZINE HYDROCHLORIDE 50 MG: 25 TABLET, FILM COATED ORAL at 01:05

## 2017-05-11 RX ADMIN — CALCIUM CARBONATE (ANTACID) CHEW TAB 500 MG 500 MG: 500 CHEW TAB at 11:05

## 2017-05-11 RX ADMIN — HEPARIN SODIUM 5000 UNITS: 5000 INJECTION, SOLUTION INTRAVENOUS; SUBCUTANEOUS at 10:05

## 2017-05-11 RX ADMIN — CALCIUM CARBONATE (ANTACID) CHEW TAB 500 MG 500 MG: 500 CHEW TAB at 04:05

## 2017-05-11 RX ADMIN — SEVELAMER CARBONATE 800 MG: 800 TABLET, FILM COATED ORAL at 09:05

## 2017-05-11 RX ADMIN — METOPROLOL TARTRATE 100 MG: 50 TABLET ORAL at 09:05

## 2017-05-11 RX ADMIN — HEPARIN SODIUM 5000 UNITS: 5000 INJECTION, SOLUTION INTRAVENOUS; SUBCUTANEOUS at 04:05

## 2017-05-11 RX ADMIN — INSULIN DETEMIR 10 UNITS: 100 INJECTION, SOLUTION SUBCUTANEOUS at 10:05

## 2017-05-11 RX ADMIN — ASPIRIN 81 MG: 81 TABLET, COATED ORAL at 09:05

## 2017-05-11 RX ADMIN — HYDRALAZINE HYDROCHLORIDE 50 MG: 25 TABLET, FILM COATED ORAL at 06:05

## 2017-05-11 RX ADMIN — HEPARIN SODIUM 5000 UNITS: 5000 INJECTION, SOLUTION INTRAVENOUS; SUBCUTANEOUS at 06:05

## 2017-05-11 RX ADMIN — CLONIDINE HYDROCHLORIDE 0.3 MG: 0.3 TABLET ORAL at 06:05

## 2017-05-11 RX ADMIN — CALCIUM CARBONATE (ANTACID) CHEW TAB 500 MG 1000 MG: 500 CHEW TAB at 10:05

## 2017-05-11 RX ADMIN — METOPROLOL TARTRATE 100 MG: 50 TABLET ORAL at 10:05

## 2017-05-11 NOTE — PLAN OF CARE
Problem: Patient Care Overview  Goal: Plan of Care Review  Outcome: Ongoing (interventions implemented as appropriate)  No pressure ulcers, repositions frequently in bed.  No fevers, WBC 7.05.  Fall precautions maintained, bed alarm on, family at bedside, bed alarm on.  Continues with dialysis.  Telemetry with SR/SB.  Will monitor VS, telemetry, labs, and medications as ordered.

## 2017-05-11 NOTE — CONSULTS
Consult Note  Palliative Care      Consult Requested By: Lea Frances MD  Reason for Consult: Disease education and end of life    SUBJECTIVE:     History of Present Illness:  Disease Process: End Stage Renal Disease  Patient is 73 yo man with medical history of L blind eye with prostehtic eye, BPH, HFrEF/HFpEF, HLD, DM, HLD, HTN, PAD s/p PTA, CKDIV, CAD s/p CABG, PVDwho presented to with edema to LE and face. Found in assisted living facility on ground. Sugar 39. In his USOH, patient takes care of himself, ambulates with walker and lives at assisted living facility. For the past week, daughter reports that patient has not been eating well, becoming more sleepy and physically weak. Denies SOB, CP, palpitations, dysuria, diarrhea/abdominal pain, fevers, chills, N/V, lightheadedness/headaches. Patient is s/p permacath placement on 5/10. During the discussion for plans for permanent access, patient stated he does not want to continue with dialysis.      Palliative Care has been consulted to discuss goals of care/hospice.  Discussed patient with Dr. Almeida    Patient was lying in bed covering his head with his blanket. He pulled the blanket when I called his name. He slightly;y drowsy but awakens to name call. He oriented to person, place and time.   Patient converse with few words and emphasis. He stated he does not want to continue with dialysis, and do understand the consequences of not continue with dialysis. He stated he would like to be discharge home for possible hospice.    We did discuss code status and he wants to remain DNR.     We discussed who should make his health dicesion and he say his Son Roslyn Salmeron.       Subsequent to above meeting, I spoke to the son Roslyn on the phone and he acknowledged tthe family will work with patient wishes but would prefer patient be discharge to inpatient hospice or home hospice with one of the sons since patient lives alone.     We will be having a family meeting in  the morning 5/12 with patient and the 2 sons to discuss final disposition for hospice placement.       Past Medical History:   Diagnosis Date    Blind left eye     BPH (benign prostatic hyperplasia)     CHF (congestive heart failure)     COPD (chronic obstructive pulmonary disease)     Diabetes mellitus type I     Hyperlipidemia     Hypertension     Neuropathy     Prosthetic eye globe     PVD (peripheral vascular disease)      Past Surgical History:   Procedure Laterality Date    COLONOSCOPY  01/01/2009    CORONARY ARTERY BYPASS GRAFT      fem/popl revasc w/stent 2011       Family History   Problem Relation Age of Onset    Family history unknown: Yes     Social History   Substance Use Topics    Smoking status: Current Every Day Smoker    Smokeless tobacco: None    Alcohol use No       Mental Status: Oriented x3    ECOG Performance Status Grade: 2 - Ambulates, capable of self care only in the past     Review of Systems:  Constitutional: no fever or chills, but feels cold  Respiratory: no cough or shortness of breath  Cardiovascular: no chest pain or palpitations  Gastrointestinal: no nausea or vomiting, no abdominal pain or change in bowel habits  Genitourinary: no hematuria or dysuria      Physical exam:  General: well developed, well nourished, calm  Head: Normocephalic, atraumatic  Neck: supple, no thyromegaly  Lungs: CTA B , normal breath sounds, no wheezes or rales  Cardiovascular: RRR, S1, S2 normal, no murmur, gallops or rubs  Abdomen: soft, non-tender non-distended; bowel sounds normal; no masses, no organomegaly.  .   Extremities: no cyanosis or edema, or clubbing. Pulses: 2+ and symmetric.  Skin: warm and dry  Neurology: awake and alert; oriented to person, place and time.       OBJECTIVE:     Pain Assessment: No pain reported at this time    Decision-Making Capacity: Patient answered questions    Advanced Directives:  Living Will: No  Do Not Resuscitate Status: Yes  Medical Power of  : Yes. Agent's Name: Roslyn Salmeron. Agent's Contact Number :    Living Arrangements: Lives alone    Psychosocial, Spiritual, Cultural:  Patient's most important priorities:  Just wants comfort.     Patient's biggest concerns/fears:   None      Patient's goals/hopes:  Wants to stop HD and be made comfortable    ASSESSMENT/PLAN:       Plan/recommendations:  Continue supportive care for now.  Code status : DNR  Family meeting with PC to discuss hospice options  Consult Spiritual Care  Palliative Care will continue to follow.      Thank you for opportunity to participate in Mr. Salmeron' care.       Debo Gomez MD, MPH    Palliative Care Team   [phone]           > 50% of 60 min visit spent in chart review, face to face discussion of goals of care, symptom assessment, coordination of care and emotional support.

## 2017-05-11 NOTE — PROGRESS NOTES
The Sw faxed the PASRR to \[facility]".     1:10pm The Sw met with the pt and his family in his room. The pt still doesn't want to go to dialysis and the family and the  in the room state the pt never wanted dialysis and has known for years he needed it. The team came in the room to speak with the pt and his family about the pt's wishes.

## 2017-05-11 NOTE — PROGRESS NOTES
[facility] received call from [facility], patient has confirmed chair time MWF 3:00- [place] location. SSC informed he is now going to SNF and time may be need to be adjusted depending on what facility patient discharges to. [place] notified.

## 2017-05-11 NOTE — PROGRESS NOTES
[facility] - Neuroendocrine Surgery Service  Daily Progress Note     HD#3  POD#2 Days Post-Op s/p RIJ permacath    Subjective  JIGAR, pt aggressive this AM, repeatedly yelling at his nephew at bedside as well as staff.     Scheduled Meds:   sodium chloride 0.9%   Intravenous Once    aspirin  81 mg Oral Daily    calcium carbonate  1,000 mg Oral QHS    calcium carbonate  500 mg Oral TID WM    cloNIDine  0.3 mg Oral TID    epoetin catarino (PROCRIT) injection  10,000 Units Intravenous Every Mon, Wed, Fri    ergocalciferol  50,000 Units Oral Q7 Days    escitalopram oxalate  20 mg Oral Daily    heparin (porcine)  5,000 Units Subcutaneous Q8H    hydrALAZINE  50 mg Oral Q8H    metoprolol tartrate  100 mg Oral BID    sevelamer carbonate  800 mg Oral TID WM    simvastatin  20 mg Oral QHS    sodium chloride 0.9%  3 mL Intravenous Q8H       Continuous Infusions:     PRN Meds:sodium chloride 0.9%, sodium chloride 0.9%, dextrose 50%, dextrose 50%, glucagon (human recombinant), glucose, glucose, heparin (porcine), hydrALAZINE, insulin aspart     Objective  Temp:  [97.6 °F (36.4 °C)-98.3 °F (36.8 °C)] 97.9 °F (36.6 °C)  Pulse:  [59-95] 60  Resp:  [18] 18  SpO2:  [95 %-100 %] 99 %  BP: (112-197)/(52-81) 170/73     I/O last 3 completed shifts:  In: 1018 [P.O.:370; Blood:248; Other:400]  Out: 1400 [Other:1400]        GEN: Yelling, swinging legs and arms  NEURO: moves all extremites  RESP: Unlabored  ABD: Unable to exam  RIJ permacath in place, dressing c/d/i, no hematoma     Labs  Recent Labs      05/08/17   1413  05/09/17   0423  05/10/17   0302  05/11/17   0423   WBC  7.24  5.83  5.31  7.05   HGB  7.8*  6.9*  7.0*  8.8*   HCT  22.8*  19.3*  19.7*  25.1*   PLT  105*  94*  86*  116*   INR  1.5*   --    --    --      Recent Labs      05/08/17   1413   05/08/17   2355  05/09/17   0423  05/09/17   0825  05/09/17   1223  05/10/17   0302   NA  136   < >  137   --   137  137  135*   K  5.6*   < >  4.2   --   4.2  3.7  4.2   CL   107   < >  103   --   102  100  99   CO2  9*   < >  15*   --   20*  24  21*   BUN  113*   < >  79*   --   81*  44*  51*   CREATININE  18.3*   < >  13.6*   --   14.2*  8.4*  9.7*   GLU  97   < >  56*   --   102  102  132*   CALCIUM  4.8*   < >  5.4*   --   5.2*  6.2*  5.7*   MG  1.6   --    --   1.3*   --    --   1.7   PHOS  11.5*   --    --   7.9*   --   4.6*  6.8*   PROT  8.5*   < >  7.3   --   6.8  7.3  6.7   ALBUMIN  3.0*   < >  2.7*   --   2.5*  2.7*  2.4*   BILITOT  0.5   < >  0.7   --   0.7  1.0  0.7   AST  36   < >  32   --   27  29  26   ALKPHOS  137*   < >  120   --   121  130  111   ALT  15   < >  14   --   12  13  11    < > = values in this interval not displayed.       Imaging  Vein Mapping:  Right upper extremity:  The internal jugular, axillary, brachial, basilic, and cephalic veins exhibit spontaneous flow which varies with respiration. The axillary, basilic, brachial veins are normally compressible.  Proximal aspect of the right cephalic vein is not visualized.  Right subclavian vein is obscured by bandages.  There is otherwise no evidence of deep venous thrombus.  The internal jugular waveforms are bilaterally symmetric. Basilic vein measures 4.1 mm, 4.4 mm, and 2.7 mm in the proximal, mid, and distal aspects.  Cephalic vein measures 1.8 mm and 1.2 mm in the mid and distal aspects, noting nonvisualization at the proximal aspect.    Left upper extremity:  The internal jugular, subclavian, axillary, brachial, basilic veins exhibit spontaneous flow which varies with respiration. The axillary, basilic, brachial veins are normally compressible.  Cephalic vein is not seen.  There is no evidence of deep venous thrombus. Basilic vein measures 3.4, 2.4, and 2.9 mm in the proximal, mid, and distal aspects     Assessment/Plan  71 yo M with ESRD, HTN, DM.    -plan for AVF tomorrow. Family not available for discussion and consent this AM, will check back later today  -HD per renal, we would like HD today if  possible in anticipation of surgery tomorrow     David Rivero MD [facility]  [facility]  c - [phone]    5/11/2017  8:04 AM

## 2017-05-11 NOTE — PROGRESS NOTES
Bedside shift report with LOUIS Aguero: At this time patient responded appropriately to orientation questions. When asked about earlier situation, patient states just was not in mood to answer questions and be bothered. He just wanted to be left alone and sleep. Family continues to be at bedside. Dr Mc notified of update. Will cont to monitor pt and intervene prn.

## 2017-05-11 NOTE — PLAN OF CARE
Problem: Patient Care Overview  Goal: Plan of Care Review  Outcome: Ongoing (interventions implemented as appropriate)  Pt received on RA.  SPO2 98 %.  Pt in no apparent respiratory distress.  Will continue to monitor.

## 2017-05-11 NOTE — PROGRESS NOTES
At 1815: Patient very restless in bed. Not answering appropriately to questions by family and nursing staff. Patient stating that he just doesn't feel right. Dr Mc notified of patient's present state. Physician will come to bedside to assess.

## 2017-05-11 NOTE — PT/OT/SLP PROGRESS
Physical Therapy      Tommie Salmeron  MRN: [id number]    Patient not seen in PM secondary to Family meeting with  to discuss treatment goals . Will follow-up 5/12/2017.    Sue Khan, PT

## 2017-05-11 NOTE — PROGRESS NOTES
"U Internal Medicine Resident HOLINN Progress Note    Subjective:      Per nurse and nephew, patient was agitated this AM, pulled out IV access, yelling "leave me alone."  This AM, when I examined patient, patient refused to be seen, yelling "Let me go. I want to die."      Objective:     Last 24 Hour Vital Signs:  BP  Min: 112/52  Max: 197/80  Temp  Av.9 °F (36.6 °C)  Min: 97.6 °F (36.4 °C)  Max: 98.3 °F (36.8 °C)  Pulse  Av.2  Min: 59  Max: 95  Resp  Av  Min: 18  Max: 18  SpO2  Av %  Min: 95 %  Max: 100 %  I/O last 3 completed shifts:  In: 1018 [P.O.:370; Blood:248; Other:400]  Out: 1400 [Other:1400]    Physical Examination:  BP (!) 170/73 (BP Location: Right arm, Patient Position: Lying, BP Method: Automatic)  Pulse 60  Temp 97.9 °F (36.6 °C) (Oral)   Resp 18  Ht 5' 9" (1.753 m)  Wt 89.9 kg (198 lb 3.1 oz)  SpO2 99%  BMI 29.27 kg/m2    Unable to complete full exam as patient is combative.     General: combative, screaming and swinging hands/kicking with anyone approaching.  Neuro: moving all extremities when agitated    Laboratory:  Laboratory Data Reviewed: yes  Recent Labs      17   1413  17   0423  05/10/17   0302  17   0423   WBC  7.24  5.83  5.31  7.05   HGB  7.8*  6.9*  7.0*  8.8*   HCT  22.8*  19.3*  19.7*  25.1*   PLT  105*  94*  86*  116*   MCV  84  80*  81*  82   RDW  15.2*  16.6*  16.4*  15.6*   GRAN  69.7  5.1  75.1*  4.4  76.0*  4.0  82.3*  5.8   LYMPH  14.4*  1.0  12.0*  0.7*  12.8*  0.7*  11.9*  0.8*   MONO  15.2*  1.1*  11.7  0.7  10.0  0.5  5.8  0.4   EOS  0.0  0.0  0.0  0.0   BASO  0.02  0.00  0.01  0.00   EOSINOPHIL  0.4  0.3  0.2  0.0   BASOPHIL  0.3  0.0  0.2  0.0       Recent Labs      17   1825  17   2355  17   0825  17   1223  05/10/17   0302   NA  137  137  137  137  135*   K  5.5*  4.2  4.2  3.7  4.2   CL  107  103  102  100  99   CO2  12*  15*  20*  24  21*   BUN  115*  79*  81*  44*  51*   CREATININE  18.5* "  13.6*  14.2*  8.4*  9.7*   GLU  76  56*  102  102  132*     Recent Labs      05/10/17   0545  05/10/17   1145  05/10/17   1633  05/10/17   2117  05/11/17   0545   POCTGLUCOSE  150*  152*  177*  194*  186*     Recent Labs      05/08/17   1825  05/08/17   2355  05/09/17   0825  05/09/17   1223  05/10/17   0302   PROT  8.0  7.3  6.8  7.3  6.7   ALBUMIN  3.0*  2.7*  2.5*  2.7*  2.4*   BILITOT  0.6  0.7  0.7  1.0  0.7   AST  32  32  27  29  26   ALT  15  14  12  13  11   ALKPHOS  133  120  121  130  111       Microbiology Data Reviewed: yes  Microbiology Results (last 7 days)     Procedure Component Value Units Date/Time    Urine culture [[id number]] Collected:  05/08/17 1507    Order Status:  Completed Specimen:  Urine Updated:  05/09/17 2343     Urine Culture, Routine No growth    Urine culture [[id number]]     Order Status:  Completed Specimen:  Urine from Urine, Clean Catch     Urine culture [[id number]]     Order Status:  Canceled Specimen:  Urine           Other Results:  EKG (my interpretation): no new     Radiology Data Reviewed: yes  Reviewed    Current Medications:     Infusions:        Scheduled:   sodium chloride 0.9%   Intravenous Once    aspirin  81 mg Oral Daily    calcium carbonate  1,000 mg Oral QHS    calcium carbonate  500 mg Oral TID WM    cloNIDine  0.3 mg Oral TID    epoetin catarino (PROCRIT) injection  10,000 Units Intravenous Every Mon, Wed, Fri    ergocalciferol  50,000 Units Oral Q7 Days    escitalopram oxalate  20 mg Oral Daily    heparin (porcine)  5,000 Units Subcutaneous Q8H    hydrALAZINE  50 mg Oral Q8H    metoprolol tartrate  100 mg Oral BID    sevelamer carbonate  800 mg Oral TID WM    simvastatin  20 mg Oral QHS    sodium chloride 0.9%  3 mL Intravenous Q8H        PRN:  sodium chloride 0.9%, sodium chloride 0.9%, dextrose 50%, dextrose 50%, glucagon (human recombinant), glucose, glucose, heparin (porcine), hydrALAZINE, insulin aspart    Antibiotics and Day Number of  Therapy:  Antibiotics     None          Assessment:     Tommie Salmeron is a 72 y.o.male with:    Plan:     Acute Renal Failure in setting of CKDV (now likely ESRD)  - follows Dr. Phillip  oliguric  has discussion for dialysis with nephrologist but refuses starting HD  - presented with K 5.6, BUN/Cr 113/18.3, Phos 11.5  baseline Cr 5-7  - EKG NSR, LAE, RBBB, LVH, L anterior fasicular block   - s/p HD x 3  permacath placed 5/9   - continue sevelamer  consulted L-nephro  planned for fistula surgery 5/12 with Gen Surg  - CM working on outpatient HD  PT/OT recs for SNF     AGMA - resolved  - AG 20 on admit, trending down to normal  likely 2/2 to ARF  - as above     Hyperkalemia/Hyperphosphatemia/Hypocalcemia - improving   - K 5.6, Phos 11.5, Ca 4.8 on admit  likely 2/2 ARF  - continue sevelamer/citrate  s/p HD x2   - electrolytes improving, monitor with BMP and HD    Vitamin D Deficiency  - Ca has been low since admit  Vit D 11  - replaced with 50K U D2  will need 6-8 weeks, then 800U D3 daily after     Hypoglycemia - resolved   - 2 episodes of BG in 30's and received D50, then BG 65 the next day  - BG  on this admission  holding home meds Novolin 10U BID given hypoglycemia  - continue SSI and accuchecks     Fall with mild Rhabdomyolysis - improving  - 1 episode of fall at assisted living  per family, >2hours LOC yesterday  - possibly related to hypoglycemia   - CK 1560, trended down  CT head with midline posterior parietal scalp mild soft tissue swelling/contusion near the vertex, without displaced skull fracture or acute intracranial findings identified     Asymptomatic Bacteruria/Hematuria   - UA with RBC 20, WBC 5, mod bacteria  - No abx for now  UCx NG     CAD s/p CABG  - continue ASA, simvastatin, Lopressor     Chronic Diastolic Dysfunction  - h/o combined systolic/diastolic HF  - 5/2015 ECHO with 40-45%, DD, mild MR/TR, MARILYN, possible restrictive CM  - CXR Overall grossly stable chronic  findings noting possible minimal interstitial edema/congestion without convincing large focal consolidation  - ECHO on this admission EF 60%, Pulm HTN 51mmg Hg, mid TR, no wall motion abnl     Uncontrolled DMII  - A1C 6.9 on this admission    - Home meds NPH 10U BID  will hold home insulin regime due to episodes of hypoglycemia  - SSI and accuchecks for now and adjust as needed     Normocytic Anemia/Thrombocytopenia  - H/H 9.2/26 on admit  baseline Hb 9-10  Plt 105, chronically low in past  - no signs of bleeding  Fe c/w AoCD  - s/p 1U PRBC, responded appropriately       HTN  - continue clonidine, lopressor, hydralazine      HLD   - repeat lipid panel: nasir 93, HDL 17, LDL 52,   - continue simvastatin     PAD s/p PTA  - 2/2012  LE Arterial with R mid SFA, regions in popliteal artery/anterior tibial, L SFA  - Continue ASA  counseled patient on smoking cessation     Depression  - continue escitalopram  - patient agitated and combative this AM  soft restraints for now, remove when improved  - will consider anxiolytic if needed    F:   E:    N: renal  PPx: heparin   Dispo: pending fistula, outpatient HD, SNF placement       Luis A Almeida  U Internal Medicine HO-I  U Internal Medicine Service Team A    [facility] Medicine Hospitalist Pager Numbers:   U Hospitalist Medicine Team A (Juan Daniel/Tobias): [phone]  [facility] Hospitalist Medicine Team B (Arnie/Michelle):  [phone]

## 2017-05-11 NOTE — PLAN OF CARE
"Patient planned for AVF tomorrow. TN spoke with Dr. Almeida this morning regarding discharge plan. Per MD notes, patient "agitated" this am. PASRR signed and uploaded into [facility]. Awaiting choices for SNF and preferences for discharge from family. Will continue to follow.     05/11/17 0917   Discharge Reassessment   Assessment Type Discharge Planning Reassessment   Discharge Plan A Skilled Nursing Facility   Discharge Plan B Home with family;Home Health   Involved the patient/caregiver in establishing a new discharge plan: Yes     Jaylene Dong RN  Transition Navigator  [phone]  "

## 2017-05-11 NOTE — PLAN OF CARE
[facility] Internal Medicine Plan of Care Note    Spoke to patient and son, Mr. Mcgowan, who has the power of , regarding goals of care for patient. Son and family have been discussing about goals of care with patient yesterday and today. I informed patient, son and family regarding plan going forward with dialysis and that patient will need dialysis and will be dependent on dialysis for the rest of his life. When I spoke to patient personally, although he does not open his eyes, he is fully oriented, answering questions appropriately. Patient is adamant that he does not want to move forward with dialysis and does not want fistula placed. Son and family all agreed that this is his wish. Patient is an independent man and would not consider dialysis as quality of life for him. Patient and son agree to cancel plan for dialysis/fistula. Consulted and contacted palliative care/Dr. Shelby to discuss goals of care/hospice moving forward from now.    Luis A Almeida, JOAQUÍNG-I  [facility] IM

## 2017-05-11 NOTE — PROGRESS NOTES
Occupational Therapy   Visit Attempt    Tommie Salmeron   MRN: [id number]       1021: Patient not seen today secondary to unable to arouse. Nursing notified. Will follow-up as time allows      1300: Second attempt made in PM to visit pt; Pt currently in meeting with MD and family for discussion of care. Will attempt again tomorrow's date     Estelle Meehan OT  5/11/2017

## 2017-05-11 NOTE — PT/OT/SLP PROGRESS
Physical Therapy      Tommie Salmeron  MRN: [id number]    Patient not seen today secondary to unable to arouse.  Nursing notified.. Will follow-up as time allows.    Sue Khan, PT

## 2017-05-11 NOTE — PROGRESS NOTES
Progress Note  Nephrology      Consult Requested By: Lea Frances MD      SUBJECTIVE:     Overnight events  Patient is a 72 y.o. male     Patient Active Problem List   Diagnosis    Renal failure    DM (diabetes mellitus) type II controlled, neurological manifestation    BPH (benign prostatic hyperplasia)    CAD (coronary artery disease)    CKD (chronic kidney disease) stage 4, GFR 15-29 ml/min    S/P CABG (coronary artery bypass graft)    DM (diabetes mellitus), type 2 with renal complications    Essential hypertension    Coronary artery disease involving coronary bypass graft of native heart without angina pectoris    Type 2 diabetes mellitus with diabetic nephropathy    Hyperglycemia    Fall    PVD (peripheral vascular disease)    Noncompliance    Renal failure (ARF), acute on chronic    Acute hyperkalemia    Hypocalcemia    Metabolic acidosis    Vitamin D deficiency    Benign prostatic hyperplasia    Coronary artery disease involving native coronary artery without angina pectoris     Past Medical History:   Diagnosis Date    Blind left eye     BPH (benign prostatic hyperplasia)     CHF (congestive heart failure)     COPD (chronic obstructive pulmonary disease)     Diabetes mellitus type I     Hyperlipidemia     Hypertension     Neuropathy     Prosthetic eye globe     PVD (peripheral vascular disease)               OBJECTIVE:     Vitals:    05/11/17 0723 05/11/17 0809 05/11/17 1120 05/11/17 1131   BP: (!) 175/77  (!) 178/76    BP Location: Right arm  Right arm    Patient Position: Lying  Lying    BP Method: Automatic  Automatic    Pulse: 60  60    Resp: 18  18    Temp: 98.3 °F (36.8 °C)  98.3 °F (36.8 °C)    TempSrc: Oral  Oral    SpO2:  97%  96%   Weight:       Height:           Temp: 98.3 °F (36.8 °C) (05/11/17 1120)  Pulse: 60 (05/11/17 1120)  Resp: 18 (05/11/17 1120)  BP: (!) 178/76 (05/11/17 1120)  SpO2: 96 % (05/11/17 1131)      Date 05/11/17 0700 - 05/12/17 0659   Shift  4732-9480 3300-3043 9564-3856 24 Hour Total   I  N  T  A  K  E   P.O. 125   125    Shift Total  (mL/kg) 125  (1.4)   125  (1.4)   O  U  T  P  U  T   Urine  (mL/kg/hr) 0   0    Shift Total  (mL/kg) 0  (0)   0  (0)   Weight (kg) 89.9 89.9 89.9 89.9             Medications:   sodium chloride 0.9%   Intravenous Once    aspirin  81 mg Oral Daily    calcium carbonate  1,000 mg Oral QHS    calcium carbonate  500 mg Oral TID WM    cloNIDine  0.3 mg Oral TID    epoetin catarino (PROCRIT) injection  10,000 Units Intravenous Every Mon, Wed, Fri    ergocalciferol  50,000 Units Oral Q7 Days    escitalopram oxalate  20 mg Oral Daily    heparin (porcine)  5,000 Units Subcutaneous Q8H    hydrALAZINE  50 mg Oral Q8H    insulin detemir  10 Units Subcutaneous QHS    metoprolol tartrate  100 mg Oral BID    sevelamer carbonate  800 mg Oral TID WM    simvastatin  20 mg Oral QHS    sodium chloride 0.9%  3 mL Intravenous Q8H        Family at bed side  Physical Exam:  General appearance: NAD   No SOB  Lungs: diminished breath sounds  Heart: Pulse 60  Abdomen: soft  Extremities: edema  Skin: dry  Laboratory:  ABG  Labs reviewed  No results found for this or any previous visit (from the past 336 hour(s)).  Recent Results (from the past 336 hour(s))   CBC auto differential    Collection Time: 05/11/17  4:23 AM   Result Value Ref Range    WBC 7.05 3.90 - 12.70 K/uL    Hemoglobin 8.8 (L) 14.0 - 18.0 g/dL    Hematocrit 25.1 (L) 40.0 - 54.0 %    Platelets 116 (L) 150 - 350 K/uL   CBC auto differential    Collection Time: 05/10/17  3:02 AM   Result Value Ref Range    WBC 5.31 3.90 - 12.70 K/uL    Hemoglobin 7.0 (L) 14.0 - 18.0 g/dL    Hematocrit 19.7 (LL) 40.0 - 54.0 %    Platelets 86 (L) 150 - 350 K/uL   CBC auto differential    Collection Time: 05/09/17  4:23 AM   Result Value Ref Range    WBC 5.83 3.90 - 12.70 K/uL    Hemoglobin 6.9 (L) 14.0 - 18.0 g/dL    Hematocrit 19.3 (LL) 40.0 - 54.0 %    Platelets 94 (L) 150 - 350 K/uL      Urinalysis  No results for input(s): COLORU, CLARITYU, SPECGRAV, PHUR, PROTEINUA, GLUCOSEU, BILIRUBINCON, BLOODU, WBCU, RBCU, BACTERIA, MUCUS, NITRITE, LEUKOCYTESUR, UROBILINOGEN, HYALINECASTS in the last 24 hours.    Diagnostic Results:  X-Ray: Reviewed  US: Reviewed  Echo: Reviewed  ACCESS    ASSESSMENT/PLAN:   ESRD  Hyponatremia  MBD  Anemia  Poor nutrition  Discussed with patient and family about continuing HD.  Will revisit tomorrow in am.   /76

## 2017-05-12 ENCOUNTER — ANESTHESIA (OUTPATIENT)
Dept: SURGERY | Facility: HOSPITAL | Age: 72
DRG: 683 | End: 2017-05-12
Payer: MEDICARE

## 2017-05-12 ENCOUNTER — SURGERY (OUTPATIENT)
Age: 72
End: 2017-05-12

## 2017-05-12 ENCOUNTER — ANESTHESIA EVENT (OUTPATIENT)
Dept: SURGERY | Facility: HOSPITAL | Age: 72
DRG: 683 | End: 2017-05-12
Payer: MEDICARE

## 2017-05-12 PROBLEM — Z71.89 GOALS OF CARE, COUNSELING/DISCUSSION: Status: ACTIVE | Noted: 2017-05-12

## 2017-05-12 LAB
1,25(OH)2D3 SERPL-MCNC: 9 PG/ML
ALBUMIN SERPL BCP-MCNC: 2.4 G/DL
ALP SERPL-CCNC: 101 U/L
ALT SERPL W/O P-5'-P-CCNC: 5 U/L
ANION GAP SERPL CALC-SCNC: 15 MMOL/L
AST SERPL-CCNC: 28 U/L
BASOPHILS # BLD AUTO: 0.01 K/UL
BASOPHILS NFR BLD: 0.1 %
BILIRUB SERPL-MCNC: 0.6 MG/DL
BUN SERPL-MCNC: 43 MG/DL
CALCIUM SERPL-MCNC: 6.2 MG/DL
CHLORIDE SERPL-SCNC: 98 MMOL/L
CK SERPL-CCNC: 608 U/L
CO2 SERPL-SCNC: 22 MMOL/L
CREAT SERPL-MCNC: 8 MG/DL
DIFFERENTIAL METHOD: ABNORMAL
EOSINOPHIL # BLD AUTO: 0 K/UL
EOSINOPHIL NFR BLD: 0.3 %
ERYTHROCYTE [DISTWIDTH] IN BLOOD BY AUTOMATED COUNT: 15.6 %
EST. GFR  (AFRICAN AMERICAN): 7 ML/MIN/1.73 M^2
EST. GFR  (NON AFRICAN AMERICAN): 6 ML/MIN/1.73 M^2
GLUCOSE SERPL-MCNC: 138 MG/DL
HCT VFR BLD AUTO: 25.6 %
HGB BLD-MCNC: 8.9 G/DL
LYMPHOCYTES # BLD AUTO: 1 K/UL
LYMPHOCYTES NFR BLD: 13.9 %
MAGNESIUM SERPL-MCNC: 1.9 MG/DL
MCH RBC QN AUTO: 28.5 PG
MCHC RBC AUTO-ENTMCNC: 34.8 %
MCV RBC AUTO: 82 FL
MONOCYTES # BLD AUTO: 0.6 K/UL
MONOCYTES NFR BLD: 7.8 %
NEUTROPHILS # BLD AUTO: 5.6 K/UL
NEUTROPHILS NFR BLD: 77.9 %
PHOSPHATE SERPL-MCNC: 5.1 MG/DL
PLATELET # BLD AUTO: 106 K/UL
PMV BLD AUTO: 10.9 FL
POCT GLUCOSE: 151 MG/DL (ref 70–110)
POCT GLUCOSE: 156 MG/DL (ref 70–110)
POCT GLUCOSE: 159 MG/DL (ref 70–110)
POCT GLUCOSE: 160 MG/DL (ref 70–110)
POTASSIUM SERPL-SCNC: 3.6 MMOL/L
PROT SERPL-MCNC: 6.5 G/DL
RBC # BLD AUTO: 3.12 M/UL
SODIUM SERPL-SCNC: 135 MMOL/L
WBC # BLD AUTO: 7.27 K/UL

## 2017-05-12 PROCEDURE — 83735 ASSAY OF MAGNESIUM: CPT

## 2017-05-12 PROCEDURE — 02PYX3Z REMOVAL OF INFUSION DEVICE FROM GREAT VESSEL, EXTERNAL APPROACH: ICD-10-PCS | Performed by: SURGERY

## 2017-05-12 PROCEDURE — 63600175 PHARM REV CODE 636 W HCPCS: Performed by: NURSE ANESTHETIST, CERTIFIED REGISTERED

## 2017-05-12 PROCEDURE — 25000003 PHARM REV CODE 250: Performed by: INTERNAL MEDICINE

## 2017-05-12 PROCEDURE — 85025 COMPLETE CBC W/AUTO DIFF WBC: CPT

## 2017-05-12 PROCEDURE — 36415 COLL VENOUS BLD VENIPUNCTURE: CPT

## 2017-05-12 PROCEDURE — 94761 N-INVAS EAR/PLS OXIMETRY MLT: CPT

## 2017-05-12 PROCEDURE — 25000003 PHARM REV CODE 250: Performed by: STUDENT IN AN ORGANIZED HEALTH CARE EDUCATION/TRAINING PROGRAM

## 2017-05-12 PROCEDURE — 99233 SBSQ HOSP IP/OBS HIGH 50: CPT | Mod: ,,, | Performed by: FAMILY MEDICINE

## 2017-05-12 PROCEDURE — 80053 COMPREHEN METABOLIC PANEL: CPT

## 2017-05-12 PROCEDURE — 36000705 HC OR TIME LEV I EA ADD 15 MIN: Performed by: SURGERY

## 2017-05-12 PROCEDURE — 71000033 HC RECOVERY, INTIAL HOUR: Performed by: SURGERY

## 2017-05-12 PROCEDURE — 37000008 HC ANESTHESIA 1ST 15 MINUTES: Performed by: SURGERY

## 2017-05-12 PROCEDURE — 82550 ASSAY OF CK (CPK): CPT

## 2017-05-12 PROCEDURE — 11000001 HC ACUTE MED/SURG PRIVATE ROOM

## 2017-05-12 PROCEDURE — 84100 ASSAY OF PHOSPHORUS: CPT

## 2017-05-12 PROCEDURE — 25000003 PHARM REV CODE 250: Performed by: NURSE ANESTHETIST, CERTIFIED REGISTERED

## 2017-05-12 PROCEDURE — 37000009 HC ANESTHESIA EA ADD 15 MINS: Performed by: SURGERY

## 2017-05-12 PROCEDURE — 36000704 HC OR TIME LEV I 1ST 15 MIN: Performed by: SURGERY

## 2017-05-12 RX ORDER — SIMVASTATIN 20 MG/1
20 TABLET, FILM COATED ORAL NIGHTLY
Status: DISCONTINUED | OUTPATIENT
Start: 2017-05-12 | End: 2017-05-13

## 2017-05-12 RX ORDER — SODIUM CHLORIDE 0.9 % (FLUSH) 0.9 %
3 SYRINGE (ML) INJECTION EVERY 8 HOURS
Status: DISCONTINUED | OUTPATIENT
Start: 2017-05-12 | End: 2017-05-13

## 2017-05-12 RX ORDER — SODIUM CHLORIDE 0.9 % (FLUSH) 0.9 %
3 SYRINGE (ML) INJECTION
Status: DISCONTINUED | OUTPATIENT
Start: 2017-05-12 | End: 2017-05-13

## 2017-05-12 RX ORDER — PANTOPRAZOLE SODIUM 40 MG/1
40 TABLET, DELAYED RELEASE ORAL DAILY
Status: DISCONTINUED | OUTPATIENT
Start: 2017-05-13 | End: 2017-05-13 | Stop reason: HOSPADM

## 2017-05-12 RX ORDER — CALCIUM CARBONATE 200(500)MG
1000 TABLET,CHEWABLE ORAL NIGHTLY
Qty: 90 TABLET | Refills: 0
Start: 2017-05-12 | End: 2018-05-12

## 2017-05-12 RX ORDER — OXYCODONE AND ACETAMINOPHEN 5; 325 MG/1; MG/1
1 TABLET ORAL EVERY 4 HOURS PRN
Status: DISCONTINUED | OUTPATIENT
Start: 2017-05-12 | End: 2017-05-13

## 2017-05-12 RX ORDER — SODIUM CHLORIDE 9 MG/ML
INJECTION, SOLUTION INTRAVENOUS ONCE
Status: DISCONTINUED | OUTPATIENT
Start: 2017-05-12 | End: 2017-05-13

## 2017-05-12 RX ORDER — OXYCODONE AND ACETAMINOPHEN 5; 325 MG/1; MG/1
1 TABLET ORAL EVERY 4 HOURS PRN
Qty: 90 TABLET | Refills: 0
Start: 2017-05-12

## 2017-05-12 RX ORDER — HYDROMORPHONE HYDROCHLORIDE 2 MG/ML
0.5 INJECTION, SOLUTION INTRAMUSCULAR; INTRAVENOUS; SUBCUTANEOUS EVERY 5 MIN PRN
Status: DISCONTINUED | OUTPATIENT
Start: 2017-05-12 | End: 2017-05-13

## 2017-05-12 RX ORDER — PROPOFOL 10 MG/ML
VIAL (ML) INTRAVENOUS CONTINUOUS PRN
Status: DISCONTINUED | OUTPATIENT
Start: 2017-05-12 | End: 2017-05-12

## 2017-05-12 RX ORDER — SODIUM CITRATE AND CITRIC ACID MONOHYDRATE 334; 500 MG/5ML; MG/5ML
30 SOLUTION ORAL ONCE
Status: DISCONTINUED | OUTPATIENT
Start: 2017-05-12 | End: 2017-05-13

## 2017-05-12 RX ORDER — ERGOCALCIFEROL 1.25 MG/1
50000 CAPSULE ORAL
Qty: 52 CAPSULE | Refills: 0
Start: 2017-05-12

## 2017-05-12 RX ORDER — SODIUM CHLORIDE 9 MG/ML
INJECTION, SOLUTION INTRAVENOUS CONTINUOUS PRN
Status: DISCONTINUED | OUTPATIENT
Start: 2017-05-12 | End: 2017-05-12

## 2017-05-12 RX ORDER — SODIUM CHLORIDE 9 MG/ML
INJECTION, SOLUTION INTRAVENOUS
Status: DISCONTINUED | OUTPATIENT
Start: 2017-05-12 | End: 2017-05-13

## 2017-05-12 RX ORDER — CALCIUM CARBONATE 200(500)MG
1500 TABLET,CHEWABLE ORAL NIGHTLY
Status: DISCONTINUED | OUTPATIENT
Start: 2017-05-12 | End: 2017-05-13 | Stop reason: HOSPADM

## 2017-05-12 RX ORDER — SODIUM CHLORIDE 9 MG/ML
INJECTION, SOLUTION INTRAVENOUS CONTINUOUS
Status: DISCONTINUED | OUTPATIENT
Start: 2017-05-12 | End: 2017-05-13

## 2017-05-12 RX ORDER — ACETAMINOPHEN 325 MG/1
650 TABLET ORAL EVERY 6 HOURS PRN
Status: DISCONTINUED | OUTPATIENT
Start: 2017-05-12 | End: 2017-05-12

## 2017-05-12 RX ORDER — MORPHINE SULFATE 30 MG/1
30 TABLET, FILM COATED, EXTENDED RELEASE ORAL EVERY 12 HOURS
Qty: 90 TABLET | Refills: 0
Start: 2017-05-12

## 2017-05-12 RX ORDER — HYDRALAZINE HYDROCHLORIDE 25 MG/1
50 TABLET, FILM COATED ORAL EVERY 8 HOURS
Status: DISCONTINUED | OUTPATIENT
Start: 2017-05-12 | End: 2017-05-13

## 2017-05-12 RX ORDER — ETOMIDATE 2 MG/ML
INJECTION INTRAVENOUS
Status: DISCONTINUED | OUTPATIENT
Start: 2017-05-12 | End: 2017-05-12

## 2017-05-12 RX ORDER — MORPHINE SULFATE 2 MG/ML
1 INJECTION, SOLUTION INTRAMUSCULAR; INTRAVENOUS EVERY 6 HOURS PRN
Status: DISCONTINUED | OUTPATIENT
Start: 2017-05-12 | End: 2017-05-12

## 2017-05-12 RX ORDER — ONDANSETRON 2 MG/ML
4 INJECTION INTRAMUSCULAR; INTRAVENOUS ONCE AS NEEDED
Status: ACTIVE | OUTPATIENT
Start: 2017-05-12 | End: 2017-05-12

## 2017-05-12 RX ORDER — MORPHINE SULFATE 30 MG/1
30 TABLET, FILM COATED, EXTENDED RELEASE ORAL EVERY 12 HOURS
Status: DISCONTINUED | OUTPATIENT
Start: 2017-05-12 | End: 2017-05-13 | Stop reason: HOSPADM

## 2017-05-12 RX ORDER — ASPIRIN 81 MG/1
81 TABLET ORAL DAILY
Status: DISCONTINUED | OUTPATIENT
Start: 2017-05-13 | End: 2017-05-13

## 2017-05-12 RX ADMIN — ETOMIDATE 4 MG: 2 INJECTION, SOLUTION INTRAVENOUS at 01:05

## 2017-05-12 RX ADMIN — SODIUM CITRATE AND CITRIC ACID MONOHYDRATE 30 ML: 500; 334 SOLUTION ORAL at 05:05

## 2017-05-12 RX ADMIN — ESCITALOPRAM 20 MG: 10 TABLET, FILM COATED ORAL at 09:05

## 2017-05-12 RX ADMIN — ACETAMINOPHEN 650 MG: 325 TABLET ORAL at 05:05

## 2017-05-12 RX ADMIN — SODIUM CHLORIDE: 0.9 INJECTION, SOLUTION INTRAVENOUS at 12:05

## 2017-05-12 RX ADMIN — SODIUM CHLORIDE, PRESERVATIVE FREE 3 ML: 5 INJECTION INTRAVENOUS at 06:05

## 2017-05-12 RX ADMIN — HYDRALAZINE HYDROCHLORIDE 50 MG: 25 TABLET, FILM COATED ORAL at 08:05

## 2017-05-12 RX ADMIN — CLONIDINE HYDROCHLORIDE 0.3 MG: 0.2 TABLET ORAL at 08:05

## 2017-05-12 RX ADMIN — HEPARIN SODIUM 5000 UNITS: 5000 INJECTION, SOLUTION INTRAVENOUS; SUBCUTANEOUS at 05:05

## 2017-05-12 RX ADMIN — METOPROLOL TARTRATE 100 MG: 50 TABLET ORAL at 09:05

## 2017-05-12 RX ADMIN — ASPIRIN 81 MG: 81 TABLET, COATED ORAL at 09:05

## 2017-05-12 RX ADMIN — CALCIUM CARBONATE (ANTACID) CHEW TAB 500 MG 500 MG: 500 CHEW TAB at 09:05

## 2017-05-12 RX ADMIN — HYDRALAZINE HYDROCHLORIDE 50 MG: 25 TABLET, FILM COATED ORAL at 05:05

## 2017-05-12 RX ADMIN — OXYCODONE AND ACETAMINOPHEN 1 TABLET: 5; 325 TABLET ORAL at 09:05

## 2017-05-12 RX ADMIN — PROPOFOL 50 MCG/KG/MIN: 10 INJECTION, EMULSION INTRAVENOUS at 01:05

## 2017-05-12 RX ADMIN — CLONIDINE HYDROCHLORIDE 0.3 MG: 0.3 TABLET ORAL at 05:05

## 2017-05-12 NOTE — PT/OT/SLP DISCHARGE
Occupational Therapy Discharge Summary    Tommie Salmeron  MRN: [id number]   Renal failure   Patient Discharged from acute Occupational Therapy on 5/12.  Please refer to prior OT note dated on 5/10 for functional status.     Assessment:   Patient has not met goals.  GOALS:   Occupational Therapy Goals        Problem: Occupational Therapy Goal    Goal Priority Disciplines Outcome Interventions   Occupational Therapy Goal     OT, PT/OT Ongoing (interventions implemented as appropriate)    Description:  Goals to be met by: 6/10/17     Patient will increase functional independence with ADLs by performing:    LE Dressing with Stand-by Assistance.  Grooming while standing with Stand-by Assistance.  Toileting from toilet with Stand-by Assistance for hygiene and clothing management.   Supine to sit with Stand-by Assistance.  Stand pivot transfers with Stand-by Assistance.  Toilet transfer to toilet with Stand-by Assistance.  Increased functional strength to WFL for self care skills and functional mobility.  Upper extremity exercise program x10 reps per handout, with independence.              Reasons for Discontinuation of Therapy Services  Transfer to alternate level of care.      Plan:  Patient Discharged to: Palliative Care/Hospice.

## 2017-05-12 NOTE — ANESTHESIA POSTPROCEDURE EVALUATION
"Anesthesia Post Evaluation    Patient: Tommie Salmeron    Procedure(s) Performed: Procedure(s) (LRB):  REMOVAL-CATHETER-DIALYSIS (N/A)    Final Anesthesia Type: MAC  Patient location during evaluation: PACU  Patient participation: Yes- Able to Participate  Level of consciousness: awake and alert  Post-procedure vital signs: reviewed and stable  Pain management: adequate  Airway patency: patent  PONV status at discharge: No PONV  Anesthetic complications: no      Cardiovascular status: hemodynamically stable  Respiratory status: unassisted  Hydration status: euvolemic  Follow-up not needed.        Visit Vitals    BP (!) 175/80    Pulse 64    Temp 36.6 °C (97.9 °F)    Resp 18    Ht 5' 9" (1.753 m)    Wt 89.9 kg (198 lb 3.1 oz)    SpO2 100%    BMI 29.27 kg/m2       Pain/Jennifer Score: Pain Assessment Performed: Yes (5/12/2017  2:00 PM)  Presence of Pain: denies (5/12/2017  2:00 PM)  Pain Rating Prior to Med Admin: 10 (5/12/2017  9:46 AM)  Jennifer Score: 10 (5/12/2017  2:00 PM)      "

## 2017-05-12 NOTE — PROGRESS NOTES
Received call from Soumya at [facility] that she spoke to son and she is meeting son at [facility] at 2:30pm today.    Moriah Mayorga, RN, CCM, CMSRN  RN Transition Navigator  [phone]

## 2017-05-12 NOTE — PLAN OF CARE
Rounded on patient. Patient not opening eyes. Called patients alonso Posadas [phone] to discuss hospice. He would like inpatient hospice and chose [facility]. Left message for Harry with [facility] and sent referral via [facility] to set up meeting this afternoon.       05/12/17 1015   Discharge Reassessment   Assessment Type Discharge Planning Reassessment   During the past month, has the patient often been bothered by feeling down, depressed or hopeless? No   During the past month, has the patient often been bothered by little interest or pleasure in doing things? No   Discharge plan remains the same: No   Provided patient/caregiver education on the expected discharge date and the discharge plan Yes   Discharge Plan A Inpatient Hospice   Involved the patient/caregiver in establishing a new discharge plan: Yes     Moriah Mayorga, RN, CCM, CMSRN  RN Transition Navigator  [phone]

## 2017-05-12 NOTE — ANESTHESIA PREPROCEDURE EVALUATION
05/12/2017  Tommie Salmeron is a 72 y.o., male w ESRF for creation AV fistula (L).    PRIOR ANES (in Epic)   2017-05-09 Dialysis_Cath MAC   [prop 120] VSS   [sevo] VSS   [no mask vent; LMA#5 1 attempt, atraumatic]    ANES-RELATED MED/SURG 2017-05-11  HTN  DM   - neuropathy   - nephropathy  HLD  CAD   - CABG  CHF Hx (but normal EF on Echo)  ESRD   - dialysis  PVD  COPD  Blind in L eye    ALLERGIES 2017-05-11  No Known Allergies    ANES-RELATED MAR 2017-05-11  Metoprolol Insulin-asp art SS sevelamer  Clonidine Insulin-detemir  Hydralazine  ASA-81  heparinSQ    Anesthesia Evaluation         Review of Systems    Wt Readings from Last 1 Encounters:   05/08/17 89.9 kg (198 lb 3.1 oz)     Temp Readings from Last 1 Encounters:   05/12/17 36.8 °C (98.3 °F) (Oral)     BP Readings from Last 1 Encounters:   05/12/17 (!) 204/87     Pulse Readings from Last 1 Encounters:   05/12/17 67     SpO2 Readings from Last 1 Encounters:   05/12/17 96%           Lab Results   Component Value Date    WBC 7.27 05/12/2017    HGB 8.9 (L) 05/12/2017    HCT 25.6 (L) 05/12/2017    MCV 82 05/12/2017     (L) 05/12/2017       Chemistry        Component Value Date/Time     (L) 05/12/2017 0333    K 3.6 05/12/2017 0333    CL 98 05/12/2017 0333    CO2 22 (L) 05/12/2017 0333    BUN 43 (H) 05/12/2017 0333    CREATININE 8.0 (H) 05/12/2017 0333     (H) 05/12/2017 0333        Component Value Date/Time    CALCIUM 6.2 (LL) 05/12/2017 0333    ALKPHOS 101 05/12/2017 0333    AST 28 05/12/2017 0333    ALT 5 (L) 05/12/2017 0333    BILITOT 0.6 05/12/2017 0333        Lab Results   Component Value Date    ALBUMIN 2.4 (L) 05/12/2017     Lab Results   Component Value Date    TSH 1.086 05/09/2017     CXR 2017-05-08  Overall grossly stable chronic findings noting possible minimal interstitial edema/congestion without convincing large focal  consolidation.  Slight haziness of the left costophrenic angle is suspected to be on the basis of positioning and soft tissue overlap with differential to include minimal layering effusion.    EKG 2017-05-10  Normal sinus rhythm  Possible Left atrial enlargement  Right bundle branch block  Left anterior fascicular block  Bifascicular block  LVH with repolarization abnormality  Abnormal ECG  When compared with ECG of 19-FEB-2015 01:20,  No significant change was found  Confirmed by LESSIG    ECHO 2017-05-09    1 - Concentric hypertrophy.     2 - No wall motion abnormalities.     3 - Normal left ventricular systolic function (EF 60-65%).     4 - Indeterminate LV diastolic function.     5 - Right ventricular enlargement with normal systolic function.     6 - Pulmonary hypertension. The estimated PA systolic pressure is 52 mmHg.     7 - Mild tricuspid regurgitation.     8 - Intermediate central venous pressure.     Anesthesia Plan  Type of Anesthesia, risks & benefits discussed:  Anesthesia Type:  MAC  Patient's Preference: MAC  Intra-op Monitoring Plan:   Intra-op Monitoring Plan Comments:   Post Op Pain Control Plan:   Post Op Pain Control Plan Comments:   Induction:   IV  Beta Blocker:         Informed Consent: Patient understands risks and agrees with Anesthesia plan.  Questions answered. Anesthesia consent signed with patient.  ASA Score: 4     Day of Surgery Review of History & Physical:            Ready For Surgery From Anesthesia Perspective.

## 2017-05-12 NOTE — PLAN OF CARE
05/12/17 1600   Transport Information   Transport Diagnosis (ESRD)   Transportation Date 05/12/17   Transported From ([facility])   Transported To ([facility])   Hospital to Hospital transfer is required due to unavailable services yes   Supporting Clinical Information   Unable to sit or travel in a seated position because Bed Confined (unable to ambulate/sit);Postural Instability   Insurance carrier notified  yes   Authorization ID ([facility] [id number])

## 2017-05-12 NOTE — CONSULTS
Surgery Consult    Patient well known to our service - in fact we removed his permacath today in the OR since he previously wanted to pursue hospice and no HD.    Patient has decided he does indeed want HD and now requires re-insertion of HD catheter.    D/w IM resident Juan Pablo, who spoke with renal - pt does not require emergent dialysis tonight as K is 3.6.    Case request submitted for tomorrow AM. Will obtain another consent tomorrow for reinsertion of dialysis catheter.    D/w Dr Collado. Pt made NPO p ZURI Cummings PGY4

## 2017-05-12 NOTE — PROGRESS NOTES
Informed by Dr. Shelby that patient and family have changed mind and DO NOT WANT HOSPICE. [facility] and [facility]ual[facility]. Called Ya with [facility] and told her to cancel bed. She has packet in case something changes over the weekend. Informed weekend TN.    Moriah Mayorga, RN, CCM, CMSRN  RN Transition Navigator  [phone]

## 2017-05-12 NOTE — PLAN OF CARE
Received call from Ya at [facility] that son signed paperwork with [facility]. Dr. Shelby meeting with family members prior to patient leaving for IP [facility].     [facility] PHN [id number] and ordered ambulance for pickup at 7pm. Gave packet to nurse Arevalo, RN to call report to   [place].       05/12/17 6885   Final Note   Assessment Type Final Discharge Note   Discharge Disposition HospiceHome   Discharge planning education complete? Yes   Hospital Follow Up  Appt(s) scheduled? No   Discharge plans and expectations educations in teach back method with documentation complete? No   Offered [facility] -- Bedside Delivery? n/a   Discharge/Hospital Encounter Summary to (non-[facility]) PCP No   Referral to Outpatient Case Management complete? No   Referral to / orders for Home Health Complete? No   30 day supply of medicines given at discharge, if documented non-compliance / non-adherence? No   Any social issues identified prior to discharge? No   Did you assess the readiness or willingness of the family or caregiver to support self management of care? No   Right Care Referral Info   Referral Type Hospice     Moriah Mayorga RN, CCM, CMSRN  RN Transition Navigator  [phone]

## 2017-05-12 NOTE — PLAN OF CARE
Problem: Patient Care Overview  Goal: Plan of Care Review  Outcome: Ongoing (interventions implemented as appropriate)  Pt on RA with sats of 98.3 Will continue to monitor.

## 2017-05-12 NOTE — OP NOTE
Preop: permacath  With hospice rtransfer  postop  : same    Procedure: removal permacath under local/ MAC    Jennifer/ Mat  EBL: minimal  Specimen: permacath  Com,plications : none    Back to room

## 2017-05-12 NOTE — TRANSFER OF CARE
"Anesthesia Transfer of Care Note    Patient: Tommie Salmeron    Procedure(s) Performed: Procedure(s) (LRB):  REMOVAL-CATHETER-DIALYSIS (N/A)    Patient location: PACU    Anesthesia Type: MAC    Transport from OR: Transported from OR on room air with adequate spontaneous ventilation    Post pain: adequate analgesia    Post assessment: no apparent anesthetic complications and tolerated procedure well    Post vital signs: stable    Level of consciousness: awake, alert and oriented    Nausea/Vomiting: no nausea/vomiting    Complications: none          Last vitals:   Visit Vitals    BP (!) 201/80    Pulse 60    Temp 36.8 °C (98.3 °F) (Oral)    Resp 17    Ht 5' 9" (1.753 m)    Wt 89.9 kg (198 lb 3.1 oz)    SpO2 98%    BMI 29.27 kg/m2     "

## 2017-05-12 NOTE — PROGRESS NOTES
"[facility] Internal Medicine Resident HO-I Progress Note    Subjective:      Patient resting in bed with family this AM. Responds appropriately to questions. Denies any pain, fevers, chills, N/V, SOB, CP.      Objective:     Last 24 Hour Vital Signs:  BP  Min: 163/71  Max: 178/76  Temp  Av.3 °F (36.8 °C)  Min: 97.8 °F (36.6 °C)  Max: 98.6 °F (37 °C)  Pulse  Av.2  Min: 60  Max: 66  Resp  Av  Min: 18  Max: 18  SpO2  Av %  Min: 96 %  Max: 100 %  I/O last 3 completed shifts:  In: 695 [P.O.:695]  Out: 0     Physical Examination:  BP (!) 172/72 (BP Location: Right arm, Patient Position: Lying, BP Method: Automatic)  Pulse 62  Temp 98.6 °F (37 °C) (Oral)   Resp 18  Ht 5' 9" (1.753 m)  Wt 89.9 kg (198 lb 3.1 oz)  SpO2 96%  BMI 29.27 kg/m2    GEN:  Resting comfortably in bed, NAD  HEENT: PERRLA, EOMI, MMM, OP clear  Lungs: mild crackles, no wheezes  Abd: soft, NT/ND, active BS  Extremities: no cyanosis/edema  Neuro: A&Ox3, moving all extremities    Laboratory:  Laboratory Data Reviewed: yes  Recent Labs      05/10/17   0302  17   0423  17   0333   WBC  5.31  7.05  7.27   HGB  7.0*  8.8*  8.9*   HCT  19.7*  25.1*  25.6*   PLT  86*  116*  106*   MCV  81*  82  82   RDW  16.4*  15.6*  15.6*   GRAN  76.0*  4.0  82.3*  5.8  77.9*  5.6   LYMPH  12.8*  0.7*  11.9*  0.8*  13.9*  1.0   MONO  10.0  0.5  5.8  0.4  7.8  0.6   EOS  0.0  0.0  0.0   BASO  0.01  0.00  0.01   EOSINOPHIL  0.2  0.0  0.3   BASOPHIL  0.2  0.0  0.1       Recent Labs      05/09/17   0825  17   1223  05/10/17   0302  17   0423  17   0333   NA  137  137  135*  134*  135*   K  4.2  3.7  4.2  4.2  3.6   CL  102  100  99  98  98   CO2  20*  24  21*  20*  22*   BUN  81*  44*  51*  36*  43*   CREATININE  14.2*  8.4*  9.7*  6.8*  8.0*   GLU  102  102  132*  180*  138*     Recent Labs      17   0545  17   1126  17   1607  17   2102  17   0625   POCTGLUCOSE  186*  219*  203*  134*  151* "     Recent Labs      05/09/17   0825  05/09/17   1223  05/10/17   0302  05/11/17   0423  05/12/17   0333   PROT  6.8  7.3  6.7  6.7  6.5   ALBUMIN  2.5*  2.7*  2.4*  2.4*  2.4*   BILITOT  0.7  1.0  0.7  0.8  0.6   AST  27  29  26  30  28   ALT  12  13  11  7*  5*   ALKPHOS  121  130  111  108  101       Microbiology Data Reviewed: yes  Microbiology Results (last 7 days)     Procedure Component Value Units Date/Time    Urine culture [[id number]] Collected:  05/08/17 1507    Order Status:  Completed Specimen:  Urine Updated:  05/09/17 2343     Urine Culture, Routine No growth    Urine culture [[id number]]     Order Status:  Completed Specimen:  Urine from Urine, Clean Catch     Urine culture [[id number]]     Order Status:  Canceled Specimen:  Urine           Other Results:  EKG (my interpretation): no new     Radiology Data Reviewed: yes  Reviewed    Current Medications:     Infusions:        Scheduled:   sodium chloride 0.9%   Intravenous Once    aspirin  81 mg Oral Daily    calcium carbonate  1,000 mg Oral QHS    calcium carbonate  500 mg Oral TID WM    citric acid-sodium citrate 500-334 mg/5 ml  30 mL Oral TID    cloNIDine  0.3 mg Oral TID    epoetin catarino (PROCRIT) injection  10,000 Units Intravenous Every Mon, Wed, Fri    ergocalciferol  50,000 Units Oral Q7 Days    escitalopram oxalate  20 mg Oral Daily    heparin (porcine)  5,000 Units Subcutaneous Q8H    hydrALAZINE  50 mg Oral Q8H    insulin detemir  10 Units Subcutaneous QHS    metoprolol tartrate  100 mg Oral BID    simvastatin  20 mg Oral QHS    sodium chloride 0.9%  3 mL Intravenous Q8H        PRN:  sodium chloride 0.9%, sodium chloride 0.9%, acetaminophen, dextrose 50%, dextrose 50%, glucagon (human recombinant), glucose, glucose, heparin (porcine), hydrALAZINE, insulin aspart    Antibiotics and Day Number of Therapy:  Antibiotics     None          Assessment:     Tommie Salmeron is a 72 y.o.male with:    Plan:     Acute Renal Failure in  setting of CKDV (now likely ESRD)  - follows Dr. Phillip  oliguric  has discussion for dialysis with nephrologist but refuses starting HD  - presented with K 5.6, BUN/Cr 113/18.3, Phos 11.5  baseline Cr 5-7  - EKG NSR, LAE, RBBB, LVH, L anterior fasicular block   - s/p HD x 3  permacath placed 5/9   - continue sevelamer  consulted L-nephro for HD in house  - spoke extensively with patient/son/family, decision for hospice: inpatient vs home   - consulted Dr. Shelby who spoke to patient/son, will meet with family again to finalize family  - consulted [facility] for hospice placement   - will need to return to OR to remove permacath      AGMA - resolved  - AG 20 on admit, trending down to normal  likely 2/2 to ARF  - as above     Hyperkalemia/Hyperphosphatemia/Hypocalcemia - improving   - K 5.6, Phos 11.5, Ca 4.8 on admit  likely 2/2 ARF  - continue sevelamer/citrate  s/p HD x2   - electrolytes improving, monitor with BMP and HD    Vitamin D Deficiency  - Ca has been low since admit  Vit D 11  - replaced with 50K U D2  will need 6-8 weeks, then 800U D3 daily after     Hypoglycemia - resolved   - 2 episodes of BG in 30's and received D50, then BG 65 the next day  - BG  on this admission  holding home meds Novolin 10U BID given hypoglycemia  - continue SSI and accuchecks     Fall with mild Rhabdomyolysis - improving  - 1 episode of fall at assisted living  per family, >2hours LOC yesterday  - possibly related to hypoglycemia   - CK 1560, trended down  CT head with midline posterior parietal scalp mild soft tissue swelling/contusion near the vertex, without displaced skull fracture or acute intracranial findings identified     Asymptomatic Bacteruria/Hematuria   - UA with RBC 20, WBC 5, mod bacteria  - No abx for now  UCx NG     CAD s/p CABG  - continue ASA, simvastatin, Lopressor     Chronic Diastolic Dysfunction  - h/o combined systolic/diastolic HF  - 5/2015 ECHO with 40-45%, DD, mild MR/TR, MARILYN,  possible restrictive CM  - CXR Overall grossly stable chronic findings noting possible minimal interstitial edema/congestion without convincing large focal consolidation  - ECHO on this admission EF 60%, Pulm HTN 51mmg Hg, mid TR, no wall motion abnl     Uncontrolled DMII  - A1C 6.9 on this admission    - Home meds NPH 10U BID  will hold home insulin regime due to episodes of hypoglycemia  - SSI and accuchecks for now and adjust as needed     Normocytic Anemia/Thrombocytopenia  - H/H 9.2/26 on admit  baseline Hb 9-10  Plt 105, chronically low in past  - no signs of bleeding  Fe c/w AoCD  - s/p 1U PRBC, responded appropriately       HTN  - continue clonidine, lopressor, hydralazine      HLD   - repeat lipid panel: nasir 93, HDL 17, LDL 52,   - continue simvastatin     PAD s/p PTA  - 2/2012  LE Arterial with R mid SFA, regions in popliteal artery/anterior tibial, L SFA  - Continue ASA  counseled patient on smoking cessation     Depression  - continue escitalopram  - patient agitated and combative this AM  soft restraints for now, remove when improved    F:   E:    N: renal  PPx: heparin   Dispo: pending hospice planning      Luis A Almeida  [facility] Internal Medicine HO-I  [facility] Internal Medicine Service Team A    [facility] Medicine Hospitalist Pager Numbers:   [facility] Hospitalist Medicine Team A (Juan Daniel/Tobias): [phone]  [facility] Hospitalist Medicine Team B (Arnie/Michelle):  [phone]

## 2017-05-12 NOTE — PLAN OF CARE
Problem: Patient Care Overview  Goal: Plan of Care Review  Outcome: Ongoing (interventions implemented as appropriate)  Restraints discontinued last night. Patient more alert, cooperative.  No pressure ulcers.  Patient decided no more dialysis.  Fall precautions maintained.  Bed alarm on.  Telemetry with NSR.  Continue with plan of care.

## 2017-05-12 NOTE — PROGRESS NOTES
Palliative Care Progress Note   Patient's primary team Dr. Almeida was concerned that patient had just declined going to hospice and requested Palliative step in for a family meeting.    Prior to meeting with patient's family we (Dr. Shelby and primary team- Dr. Almeida) had met and discussed patient sudden change of mind to resume dialysis even after patient son Roslyn (POA) had completed and signed hospice contract with [facility] as requested by patient earlier. Patient change of mind was noticed during the siblings and Cousins visit this afternoon. They had expressed their wishes to continue dialysis and not give up.     Palliative care met with patient's family- two sisters, brother, sons- Roslyn and Alana, sister in law and daughter in law in a conference to discuss Mr. Salmeron. The siblings wants still want him to proceed with HD while the sons would like to honor their father wish not to continue with dialysis The sons stated patient had been clear on the decision not to have dialysis over the past 4 years once his nephrologists discussed his declining renal function is declining. We did agree to honor and support the patient on whatever decision he arrives at. We also discussed possible placement if and when he is discharge and everyone Okayed NH placement.     Palliative Care and family proceeded to patient bedside and I led the discussion. Patient stated he would like to resume dialysis, he is willing to have permacath and permanent access procedure and when clinically improved he is will to proceed with group home NH placement.       Subsequent to above meeting, I discussed feedback with Primary team Dr. Almeida      Plan/Recommendations:  Continue supportive care for now  Patient code status remains DNR.   Primary Team to consult for Jonas catheter placement and possible permanent access  Family desires Nursing Home placement  Primary Team will take on more aggressive approach on his ADL   May be a  good candidate for AIM program at this point and can transition to hospice when ready.   Consult Spiritual Care   Palliative Care Team will continue to follow patient.        Debo Gomez MD, MPH   Palliative Care Team   [phone]      > 70% of 120 min visit spent in chart review, face to face discussion of goals of care, symptom assessment, coordination of care and emotional support.

## 2017-05-12 NOTE — PLAN OF CARE
[facility]    FACILITY TRANSFER ORDERS      Patient Name: Tommie Salmeron  YOB: 1945    PCP: Bridger Deleon MD   PCP Address: [address] [place] [address] / [place] [id number]  PCP Phone Number: [phone]  PCP Fax: [phone]    Encounter Date: 05/13/2017    Admit to: [facility]    Vital Signs:  Routine    Diagnoses:   Active Hospital Problems    Diagnosis  POA    *Renal failure [N19]  Yes    Goals of care, counseling/discussion [Z71.89]  Not Applicable    End of life care [Z51.5]  Not Applicable    Encounter for hospice care discussion [Z71.89]  Not Applicable    Vitamin D deficiency [E55.9]  Yes    Benign prostatic hyperplasia [N40.0]  Yes    Coronary artery disease involving native coronary artery without angina pectoris [I25.10]  Yes    Acute hyperkalemia [E87.5]  Yes    Hypocalcemia [E83.51]  Yes    Metabolic acidosis [E87.2]  Yes    Renal failure (ARF), acute on chronic [N17.9, N18.9]  Yes    PVD (peripheral vascular disease) [I73.9]  Yes    Essential hypertension [I10]  Yes    DM (diabetes mellitus) type II controlled, neurological manifestation [E11.49]  Yes    BPH (benign prostatic hyperplasia) [N40.0]  Yes    CAD (coronary artery disease) [I25.10]  Yes    S/P CABG (coronary artery bypass graft) [Z95.1]  Not Applicable      Resolved Hospital Problems    Diagnosis Date Resolved POA   No resolved problems to display.       Allergies:Review of patient's allergies indicates:  No Known Allergies    Diet: regular diet    Activities: Activity as tolerated    Nursing: Pain medications per hospice protocol.     Labs: None    CONSULTS:  None    MISCELLANEOUS CARE:  None    WOUND CARE ORDERS  None    Medications: Review discharge medications with patient and family and provide education.      Current Discharge Medication List      START taking these medications    Details   calcium carbonate (TUMS) 200 mg calcium (500 mg) chewable tablet Take 2 tablets (1,000 mg total)  "by mouth every evening.  Qty: 90 tablet, Refills: 0      ergocalciferol (ERGOCALCIFEROL) 50,000 unit Cap Take 1 capsule (50,000 Units total) by mouth every 7 days.  Qty: 52 capsule, Refills: 0      morphine (MS CONTIN) 30 MG 12 hr tablet Take 1 tablet (30 mg total) by mouth every 12 (twelve) hours.  Qty: 90 tablet, Refills: 0      oxycodone-acetaminophen (PERCOCET) 5-325 mg per tablet Take 1 tablet by mouth every 4 (four) hours as needed.  Qty: 90 tablet, Refills: 0         CONTINUE these medications which have NOT CHANGED    Details   escitalopram oxalate (LEXAPRO) 20 MG tablet TAKE ONE TABLET BY MOUTH ONCE DAILY  Qty: 30 tablet, Refills: 0      furosemide (LASIX) 20 MG tablet TAKE ONE TABLET BY MOUTH ONCE DAILY  Qty: 90 tablet, Refills: 0      LYRICA 75 mg capsule TAKE ONE CAPSULE BY MOUTH NIGHTLY  Qty: 90 capsule, Refills: 0      pantoprazole (PROTONIX) 40 MG tablet TAKE ONE TABLET BY MOUTH ONCE DAILY  Qty: 90 tablet, Refills: 0      spironolactone (ALDACTONE) 25 MG tablet TAKE ONE TABLET BY MOUTH ONCE DAILY  Qty: 90 tablet, Refills: 0         STOP taking these medications       aspirin (ECOTRIN) 81 MG EC tablet Comments:   Reason for Stopping:         BLOOD SUGAR DIAGNOSTIC (TRUETEST TEST STRIPS MISC) Comments:   Reason for Stopping:         cloNIDine (CATAPRES) 0.2 MG tablet Comments:   Reason for Stopping:         insulin safety needles, disp, (PRODIGY PEN NEEDLE) 29 x 1/2 " Ndle Comments:   Reason for Stopping:         lancets Misc Comments:   Reason for Stopping:         metoprolol tartrate (LOPRESSOR) 100 MG tablet Comments:   Reason for Stopping:         NOVOLIN 70/30 100 unit/mL (70-30) injection Comments:   Reason for Stopping:         simvastatin (ZOCOR) 20 MG tablet Comments:   Reason for Stopping:                    _________________________________  Luis A Almeida MD  05/13/2017        "

## 2017-05-12 NOTE — PT/OT/SLP DISCHARGE
Physical Therapy progress note and  Discharge Summary    Tommie Salmeron  MRN: [id number]   Renal failure     Attempted to see pt this am.  Got pt to open 1 eye then closed.  Would not verbally respond but shaking head yes and no.  Refused therapy.  Notified nurse.  Went to document refusal and order present to dc PT and CM note states spoke to family and pt will go to in pt hospice.        Patient Discharged from acute Physical Therapy on 17.  Please refer to prior PT noted date on 5/10/17 for functional status.     Assessment:   Patient appropriate for care in another setting. Patient has not met goals.  GOALS:   Physical Therapy Goals        Problem: Physical Therapy Goal    Goal Priority Disciplines Outcome Goal Variances Interventions   Physical Therapy Goal     PT/OT, PT Ongoing (interventions implemented as appropriate)     Description:  Goals to be met by: 2017     Patient will increase functional independence with mobility by performin. Supine to sit with Stand-by Assistance  2. Sit to stand transfer with Stand-by Assistance  3. Gait  x 100 feet with Stand-by Assistance using Rolling Walker.               Reasons for Discontinuation of Therapy Services  Transfer to alternate level of care.      Plan:  Patient Discharged to: Palliative Care/Hospice.

## 2017-05-13 VITALS
RESPIRATION RATE: 18 BRPM | HEART RATE: 71 BPM | SYSTOLIC BLOOD PRESSURE: 185 MMHG | HEIGHT: 69 IN | WEIGHT: 198.19 LBS | DIASTOLIC BLOOD PRESSURE: 73 MMHG | TEMPERATURE: 98 F | BODY MASS INDEX: 29.36 KG/M2 | OXYGEN SATURATION: 100 %

## 2017-05-13 LAB
ALBUMIN SERPL BCP-MCNC: 2.4 G/DL
ALLENS TEST: ABNORMAL
ALP SERPL-CCNC: 104 U/L
ALT SERPL W/O P-5'-P-CCNC: 7 U/L
ANION GAP SERPL CALC-SCNC: 14 MMOL/L
AST SERPL-CCNC: 33 U/L
BILIRUB SERPL-MCNC: 0.6 MG/DL
BUN SERPL-MCNC: 48 MG/DL
CALCIUM SERPL-MCNC: 6.2 MG/DL
CHLORIDE SERPL-SCNC: 101 MMOL/L
CO2 SERPL-SCNC: 21 MMOL/L
CREAT SERPL-MCNC: 9 MG/DL
EST. GFR  (AFRICAN AMERICAN): 6 ML/MIN/1.73 M^2
EST. GFR  (NON AFRICAN AMERICAN): 5 ML/MIN/1.73 M^2
GLUCOSE SERPL-MCNC: 152 MG/DL
HCO3 UR-SCNC: 26.5 MMOL/L (ref 24–28)
MAGNESIUM SERPL-MCNC: 1.9 MG/DL
PCO2 BLDA: 38.8 MMHG (ref 35–45)
PH SMN: 7.44 [PH] (ref 7.35–7.45)
PHOSPHATE SERPL-MCNC: 5.1 MG/DL
PO2 BLDA: 70 MMHG (ref 80–100)
POC BE: 2 MMOL/L
POC SATURATED O2: 94 % (ref 95–100)
POC TCO2: 28 MMOL/L (ref 23–27)
POCT GLUCOSE: 164 MG/DL (ref 70–110)
POCT GLUCOSE: 178 MG/DL (ref 70–110)
POTASSIUM SERPL-SCNC: 3.6 MMOL/L
PROT SERPL-MCNC: 6.6 G/DL
SAMPLE: ABNORMAL
SITE: ABNORMAL
SODIUM SERPL-SCNC: 136 MMOL/L

## 2017-05-13 PROCEDURE — 63600175 PHARM REV CODE 636 W HCPCS: Performed by: INTERNAL MEDICINE

## 2017-05-13 PROCEDURE — 25000003 PHARM REV CODE 250: Performed by: INTERNAL MEDICINE

## 2017-05-13 PROCEDURE — 36415 COLL VENOUS BLD VENIPUNCTURE: CPT

## 2017-05-13 PROCEDURE — 25000003 PHARM REV CODE 250: Performed by: STUDENT IN AN ORGANIZED HEALTH CARE EDUCATION/TRAINING PROGRAM

## 2017-05-13 PROCEDURE — 36600 WITHDRAWAL OF ARTERIAL BLOOD: CPT

## 2017-05-13 PROCEDURE — 83735 ASSAY OF MAGNESIUM: CPT

## 2017-05-13 PROCEDURE — 82803 BLOOD GASES ANY COMBINATION: CPT

## 2017-05-13 PROCEDURE — 63600175 PHARM REV CODE 636 W HCPCS: Performed by: STUDENT IN AN ORGANIZED HEALTH CARE EDUCATION/TRAINING PROGRAM

## 2017-05-13 PROCEDURE — 80053 COMPREHEN METABOLIC PANEL: CPT

## 2017-05-13 PROCEDURE — 84100 ASSAY OF PHOSPHORUS: CPT

## 2017-05-13 RX ORDER — MORPHINE SULFATE 30 MG/1
30 TABLET, FILM COATED, EXTENDED RELEASE ORAL EVERY 12 HOURS
Status: DISCONTINUED | OUTPATIENT
Start: 2017-05-13 | End: 2017-05-13 | Stop reason: HOSPADM

## 2017-05-13 RX ORDER — HEPARIN SODIUM 5000 [USP'U]/ML
5000 INJECTION, SOLUTION INTRAVENOUS; SUBCUTANEOUS EVERY 8 HOURS
Status: DISCONTINUED | OUTPATIENT
Start: 2017-05-13 | End: 2017-05-13

## 2017-05-13 RX ORDER — MORPHINE SULFATE 2 MG/ML
4 INJECTION, SOLUTION INTRAMUSCULAR; INTRAVENOUS ONCE
Status: COMPLETED | OUTPATIENT
Start: 2017-05-13 | End: 2017-05-13

## 2017-05-13 RX ORDER — HYDROCODONE BITARTRATE AND ACETAMINOPHEN 5; 325 MG/1; MG/1
1 TABLET ORAL EVERY 6 HOURS PRN
Status: DISCONTINUED | OUTPATIENT
Start: 2017-05-13 | End: 2017-05-13 | Stop reason: HOSPADM

## 2017-05-13 RX ORDER — ONDANSETRON 8 MG/1
8 TABLET, ORALLY DISINTEGRATING ORAL EVERY 6 HOURS PRN
Status: DISCONTINUED | OUTPATIENT
Start: 2017-05-13 | End: 2017-05-13 | Stop reason: HOSPADM

## 2017-05-13 RX ORDER — MORPHINE SULFATE 4 MG/ML
4 INJECTION, SOLUTION INTRAMUSCULAR; INTRAVENOUS ONCE
Status: DISCONTINUED | OUTPATIENT
Start: 2017-05-13 | End: 2017-05-13

## 2017-05-13 RX ADMIN — CLONIDINE HYDROCHLORIDE 0.3 MG: 0.2 TABLET ORAL at 05:05

## 2017-05-13 RX ADMIN — CALCIUM GLUCONATE 1000 MG: 94 INJECTION, SOLUTION INTRAVENOUS at 07:05

## 2017-05-13 RX ADMIN — HYDRALAZINE HYDROCHLORIDE 50 MG: 25 TABLET, FILM COATED ORAL at 05:05

## 2017-05-13 RX ADMIN — OXYCODONE AND ACETAMINOPHEN 1 TABLET: 5; 325 TABLET ORAL at 05:05

## 2017-05-13 RX ADMIN — MORPHINE SULFATE 4 MG: 2 INJECTION, SOLUTION INTRAMUSCULAR; INTRAVENOUS at 04:05

## 2017-05-13 RX ADMIN — HEPARIN SODIUM 5000 UNITS: 5000 INJECTION, SOLUTION INTRAVENOUS; SUBCUTANEOUS at 05:05

## 2017-05-13 RX ADMIN — MORPHINE SULFATE 30 MG: 30 TABLET, EXTENDED RELEASE ORAL at 12:05

## 2017-05-13 NOTE — PLAN OF CARE
05/13/17 1415   Final Note   Assessment Type Final Discharge Note   Discharge Disposition [facility]   Right [facility] Referral Info   Post Acute Recommendation Other   Referral Type IP hospice   Facility Name  Hospice

## 2017-05-13 NOTE — PROGRESS NOTES
"Neuroendocrine Surgery Progress Note    Interval: s/p tunneled catheter removal yesterday, pt and family changed mind and refused hospice, wanted catheter replacement for HD. However, overnight pt changed his mind and this AM on rounds did not want to have catheter or even IVs placed.    Vitals:    05/13/17 0100 05/13/17 0300 05/13/17 0405 05/13/17 0450   BP:   (!) 149/65    BP Location:   Right arm    Patient Position:   Lying    BP Method:   Automatic    Pulse: 62 66 70    Resp:   19    Temp:   97.7 °F (36.5 °C)    TempSrc:       SpO2:    100%   Weight:       Height:         Intake/Output - Last 3 Shifts       05/11 0700 - 05/12 0659 05/12 0700 - 05/13 0659 05/13 0700 - 05/14 0659    P.O. 575 185     I.V. (mL/kg)  100 (1.1)     Blood       Other       Total Intake(mL/kg) 575 (6.4) 285 (3.2)     Urine (mL/kg/hr) 0 (0) 0 (0)     Other       Stool 0 (0) 0 (0)     Total Output 0 0      Net +575 +285             Urine Occurrence 0 x 0 x     Stool Occurrence 0 x 0 x           Gen: Awake, repeats "give me peace"  CV: RRR  Resp: not examined  Abd: not examined  : not examined  MSK: R neck dressing w small amt serosang drainage    Labs: reviewed    Radiology: none    A/P: CKD/ANIRUDH on HD, ESRD, s/p removal of tunneled vascath  -pt declined line placement this am. He and family needs to have discussion on goals of care. We will remain available - please call if they decide to follow through with surgical line placement  -d/w IM resident, rec'ed spiritual consult//someone other than MD/RN to speak with patient and family who is not involved in medical decision making and may be perceived as having conflict of interest    Zoila PGY4    "

## 2017-05-13 NOTE — PLAN OF CARE
"During shift report pt would not participate and only stated to both night shift and day shift nurse "please just give me peace."  We asked what that meant and he stated "please call the doctor to give me peace."  We asked him to verify what "Peace" meant to him and he stated "let me not open my eyes to this world ever again" then stated "let me not hear anything in this world ever again."  "

## 2017-05-13 NOTE — PLAN OF CARE
MET called on patient due to decreased responsiveness to stimuli. On arrival, patient difficult to arouse but responsive to painful stimuli. Patient's level of consciousness quickly improved and he became responsive to verbal stimuli. He perked up and answered questions appropriately and verbalized his frustration with his current situation. Vitals stable, , stat CMP and ABG ordered.    Peter Srinivasan MD

## 2017-05-13 NOTE — NURSING
"Patient refusing to have peripheral IV restarted. Patient stated, "I don't want it. I don't want any of this. I just want ya'll to give me something to make it all just stop. My sister told said yesterday that I don't get to choose my time". Dr Collado came to bedside. Patient refusing dialysis and dialysis catheter placement.   "

## 2017-05-13 NOTE — PROGRESS NOTES
"[facility] Medicine Resident [facility] Progress Note    Subjective:      Patient awake this morning but only responds selectively to questions. Patient refusing to speak for consent. Spoke to patient in person this AM, he agrees to getting IV access but would not respond to questions about dialysis.       Objective:     Last 24 Hour Vital Signs:  BP  Min: 149/65  Max: 204/87  Temp  Av °F (36.7 °C)  Min: 97.6 °F (36.4 °C)  Max: 98.3 °F (36.8 °C)  Pulse  Av.8  Min: 60  Max: 70  Resp  Av.3  Min: 12  Max: 19  SpO2  Av.4 %  Min: 96 %  Max: 100 %  I/O last 3 completed shifts:  In: 685 [P.O.:585; I.V.:100]  Out: 0     Physical Examination:  BP (!) 149/65 (BP Location: Right arm, Patient Position: Lying, BP Method: Automatic)  Pulse 70  Temp 97.7 °F (36.5 °C)  Resp 19  Ht 5' 9" (1.753 m)  Wt 89.9 kg (198 lb 3.1 oz)  SpO2 100%  BMI 29.27 kg/m2    GEN:  Resting comfortably in bed, NAD, only responds to certain questions  HEENT: PERRLA, EOMI, MMM, OP clear  Lungs: mild crackles to b/l lung bases, no wheezes  Abd: soft, NT/ND, active BS  Extremities: no cyanosis/edema  Neuro: A&Ox3, moving all extremities    Laboratory:  Laboratory Data Reviewed: yes  Recent Labs      17   0423  17   0333   WBC  7.05  7.27   HGB  8.8*  8.9*   HCT  25.1*  25.6*   PLT  116*  106*   MCV  82  82   RDW  15.6*  15.6*   GRAN  82.3*  5.8  77.9*  5.6   LYMPH  11.9*  0.8*  13.9*  1.0   MONO  5.8  0.4  7.8  0.6   EOS  0.0  0.0   BASO  0.00  0.01   EOSINOPHIL  0.0  0.3   BASOPHIL  0.0  0.1       Recent Labs      17   0423  17   0333  17   0042   NA  134*  135*  136   K  4.2  3.6  3.6   CL  98  98  101   CO2  20*  22*  21*   BUN  36*  43*  48*   CREATININE  6.8*  8.0*  9.0*   GLU  180*  138*  152*     Recent Labs      17   1123  17   1603  17   2054  17   0035  17   0617   POCTGLUCOSE  159*  160*  156*  164*  178*     Recent Labs      17   0423  17   0333  " 05/13/17   0042   PROT  6.7  6.5  6.6   ALBUMIN  2.4*  2.4*  2.4*   BILITOT  0.8  0.6  0.6   AST  30  28  33   ALT  7*  5*  7*   ALKPHOS  108  101  104       Microbiology Data Reviewed: yes  Microbiology Results (last 7 days)     Procedure Component Value Units Date/Time    Urine culture [[id number]] Collected:  05/08/17 1507    Order Status:  Completed Specimen:  Urine Updated:  05/09/17 2343     Urine Culture, Routine No growth    Urine culture [[id number]]     Order Status:  Completed Specimen:  Urine from Urine, Clean Catch     Urine culture [[id number]]     Order Status:  Canceled Specimen:  Urine           Other Results:  EKG (my interpretation): no new     Radiology Data Reviewed: yes  Reviewed    Current Medications:     Infusions:   sodium chloride 0.9%          Scheduled:   sodium chloride 0.9%   Intravenous Once    aspirin  81 mg Oral Daily    calcium carbonate  1,500 mg Oral QHS    calcium gluconate IVPB  1,000 mg Intravenous Once    citric acid-sodium citrate 500-334 mg/5 ml  30 mL Oral Once    cloNIDine  0.3 mg Oral TID    epoetin catarino (PROCRIT) injection  10,000 Units Intravenous Once    ergocalciferol  50,000 Units Oral Q7 Days    escitalopram oxalate  20 mg Oral Daily    heparin (porcine)  5,000 Units Subcutaneous Q8H    hydrALAZINE  50 mg Oral Q8H    morphine  30 mg Oral Q12H    pantoprazole  40 mg Oral Daily    simvastatin  20 mg Oral QHS    sodium chloride 0.9%  3 mL Intravenous Q8H        PRN:  sodium chloride 0.9%, HYDROmorphone, HYDROmorphone, oxycodone-acetaminophen, promethazine (PHENERGAN) IVPB, sodium chloride 0.9%    Antibiotics and Day Number of Therapy:  Antibiotics     None          Assessment:     Tommie Salmeron is a 72 y.o.male with:    Plan:     Acute Renal Failure in setting of CKDV (now likely ESRD)  - follows Dr. Phillip  oliguric  has discussion for dialysis with nephrologist but refuses starting HD  - presented with K 5.6, BUN/Cr 113/18.3, Phos 11.5   baseline Cr 5-7  - EKG NSR, LAE, RBBB, LVH, L anterior fasicular block   - s/p HD x 3  permacath placed 5/9   - continue sevelamer  consulted L-nephro for HD in house  - spoke extensively with patient/son/family, decision for hospice: inpatient vs home   - consulted Dr. Shelby who spoke to patient/son, will meet with family again to finalize family  - consulted [facility] for hospice placement, cancelled hospice due patient changing deicions  - Dr. Shelby/Palliative care discussed goals of care with the son/patient and family 5/13, patient has been refusing dialysis without presence of family, but agrees to dialysis with family presence.  - Refused to consent for permacath placement this AM  spoke to son this AM who agrees HD plan going forward      AGMA - resolved  - AG 20 on admit, trending down to normal  likely 2/2 to ARF  - as above     Hyperkalemia/Hyperphosphatemia/Hypocalcemia - improving   - K 5.6, Phos 11.5, Ca 4.8 on admit  likely 2/2 ARF  - continue sevelamer/citrate  s/p HD x2   - electrolytes improving, monitor with BMP and HD    Vitamin D Deficiency  - Ca has been low since admit  Vit D 11  - replaced with 50K U D2  will need 6-8 weeks, then 800U D3 daily after     Hypoglycemia - resolved   - 2 episodes of BG in 30's and received D50, then BG 65 the next day  - BG controlled on this admission  holding home meds Novolin 10U BID given hypoglycemia  - continue SSI and accuchecks     Fall with mild Rhabdomyolysis - improving  - 1 episode of fall at assisted living  per family, >2hours LOC yesterday  - possibly related to hypoglycemia   - CK 1560, trended down  CT head with midline posterior parietal scalp mild soft tissue swelling/contusion near the vertex, without displaced skull fracture or acute intracranial findings identified     Asymptomatic Bacteruria/Hematuria   - UA with RBC 20, WBC 5, mod bacteria  - No abx for now  UCx NG     CAD s/p CABG  - continue ASA, simvastatin,  Lopressor     Chronic Diastolic Dysfunction  - h/o combined systolic/diastolic HF   - 5/2015 ECHO with 40-45%, DD, mild MR/TR, MARILYN, possible restrictive CM  - CXR Overall grossly stable chronic findings noting possible minimal interstitial edema/congestion without convincing large focal consolidation  - ECHO on this admission EF 60%, Pulm HTN 51mmg Hg, mid TR, no wall motion abnl     Uncontrolled DMII  - A1C 6.9 on this admission    - Home meds NPH 10U BID  will hold home insulin regime due to episodes of hypoglycemia  - SSI and accuchecks for now and adjust as needed     Normocytic Anemia/Thrombocytopenia  - H/H 9.2/26 on admit  baseline Hb 9-10  Plt 105, chronically low in past  - no signs of bleeding  Fe c/w AoCD  - s/p 1U PRBC, responded appropriately       HTN  - continue clonidine, lopressor, hydralazine      HLD   - repeat lipid panel: nasir 93, HDL 17, LDL 52,   - continue simvastatin     PAD s/p PTA  - 2/2012 US LE Arterial with R mid SFA, regions in popliteal artery/anterior tibial, L SFA  - Continue ASA  counseled patient on smoking cessation     Depression  - continue escitalopram  - patient agitated and combative this AM  soft restraints for now, remove when improved    F:   E:    N: renal  PPx: heparin   Dispo: consult CM for re-setting HD outpatient, however, patient refusing permacath placement this AM      Luis A Almeida  [facility] Internal Medicine HO-I  [facility] Internal Medicine Service Team A     [facility] Medicine Hospitalist Pager Numbers:   [facility] Hospitalist Medicine Team A (Juan Daniel/Tboias): [phone]  [facility] Hospitalist Medicine Team B (Arnie/Michelle):  [phone]

## 2017-05-13 NOTE — NURSING
Dr Mcconnell at bedside to discuss with patient his refusal of treatment. Patient agreed to allow PIV restart.

## 2017-05-13 NOTE — PROGRESS NOTES
The Sw met with the pt and his family along with the team. The pt stated he doesn't want to proceed with dialysis and wants his wishes to be respected. He asked his sister Adrianne and his 2 sons Roslyn and Ricardo to respect his wishes. The pt's agreeable to IP at [facility]. The Sw spoke to the charge nurse [phone])at [facility]. The Sw faxed her the corrected discharge orders to [phone]. She states the pt will go to room 8B. She will call the pt after she receives the discharge info. The Sw will arrange the transport after Kay calls. The Sw spoke to the pt's nurse and informed her of this info.      1:30pm The Sw spoke to Kay at [facility] and the pt's clear to arrive. The pt's going to room 8B. The Sw spoke to the pt's nurse and gave her the contact info to call report,the packet was already copied from yesterday. The Sw arranged the transport again with [facility]([phone])for 2:30pm. The pt's family was in the room and were informed of the transport time and they're in agreement with the d/c plan. The [facility] transport auth is [id number].

## 2017-05-13 NOTE — SIGNIFICANT EVENT
"MET called on patient due to no response to any stimuli. Patient is breathing and pulses are palpated. /82, pulse 66, 99% O2 sat on RA, . Dr. Srinivasan at beside orders ABGs and CMP.  Patient begins responding again during MET and states again to the physician that he would like a "shot to help him go home". Patient is refusing any other medication.  "

## 2017-05-13 NOTE — NURSING
"Notiified Dr. Carreno that patient is refusing medications and would like a "shot to bring him home".  He is refusing any more medical treatments furthur.  Dr. Carreno asks to try to give the Calcium but we cannot force him if he is refusing.  "

## 2017-05-13 NOTE — PROGRESS NOTES
Progress Note  Nephrology      Consult Requested By: Lea Frances MD      SUBJECTIVE:     Overnight events  Patient is a 72 y.o. male     Patient Active Problem List   Diagnosis    Renal failure    DM (diabetes mellitus) type II controlled, neurological manifestation    BPH (benign prostatic hyperplasia)    CAD (coronary artery disease)    CKD (chronic kidney disease) stage 4, GFR 15-29 ml/min    S/P CABG (coronary artery bypass graft)    DM (diabetes mellitus), type 2 with renal complications    Essential hypertension    Coronary artery disease involving coronary bypass graft of native heart without angina pectoris    Type 2 diabetes mellitus with diabetic nephropathy    Hyperglycemia    Fall    PVD (peripheral vascular disease)    Noncompliance    Renal failure (ARF), acute on chronic    Acute hyperkalemia    Hypocalcemia    Metabolic acidosis    Vitamin D deficiency    Benign prostatic hyperplasia    Coronary artery disease involving native coronary artery without angina pectoris    End of life care    Encounter for hospice care discussion     Past Medical History:   Diagnosis Date    Blind left eye     BPH (benign prostatic hyperplasia)     CHF (congestive heart failure)     COPD (chronic obstructive pulmonary disease)     Diabetes mellitus type I     Hyperlipidemia     Hypertension     Neuropathy     Prosthetic eye globe     PVD (peripheral vascular disease)               OBJECTIVE:     Vitals:    05/12/17 1345 05/12/17 1400 05/12/17 1610 05/12/17 1806   BP: (!) 174/82 (!) 175/80 (!) 167/76    BP Location:       Patient Position:       BP Method:       Pulse: 64 64 63    Resp: 19 18 18    Temp: 97.9 °F (36.6 °C)  97.6 °F (36.4 °C)    TempSrc:   Oral    SpO2: 99% 100%  98%   Weight:       Height:           Temp: 97.6 °F (36.4 °C) (05/12/17 1610)  Pulse: 63 (05/12/17 1610)  Resp: 18 (05/12/17 1610)  BP: (!) 167/76 (05/12/17 1610)  SpO2: 98 % (05/12/17 1806)      Date 05/12/17  0700 - 05/13/17 0659   Shift 8389-8267 9400-7256 0075-0789 24 Hour Total   I  N  T  A  K  E   P.O. 60 125  185    I.V.  (mL/kg) 100  (1.1)   100  (1.1)    Shift Total  (mL/kg) 160  (1.8) 125  (1.4)  285  (3.2)   O  U  T  P  U  T   Urine  (mL/kg/hr)  0  0    Shift Total  (mL/kg)  0  (0)  0  (0)   Weight (kg) 89.9 89.9 89.9 89.9             Medications:   sodium chloride 0.9%   Intravenous Once    [START ON 5/13/2017] aspirin  81 mg Oral Daily    calcium carbonate  1,500 mg Oral QHS    citric acid-sodium citrate 500-334 mg/5 ml  30 mL Oral Once    cloNIDine  0.3 mg Oral TID    epoetin catarino (PROCRIT) injection  10,000 Units Intravenous Once    ergocalciferol  50,000 Units Oral Q7 Days    escitalopram oxalate  20 mg Oral Daily    hydrALAZINE  50 mg Oral Q8H    morphine  30 mg Oral Q12H    [START ON 5/13/2017] pantoprazole  40 mg Oral Daily    simvastatin  20 mg Oral QHS    sodium chloride 0.9%  3 mL Intravenous Q8H      sodium chloride 0.9%                 Physical Exam:  General appearance:NAD  Lungs: diminished breath sounds  Heart: Pulse 63  Abdomen: soft  Extremities: edema  Skin: dry  Laboratory:  ABG  Labs reviewed  No results found for this or any previous visit (from the past 336 hour(s)).  Recent Results (from the past 336 hour(s))   CBC auto differential    Collection Time: 05/12/17  3:33 AM   Result Value Ref Range    WBC 7.27 3.90 - 12.70 K/uL    Hemoglobin 8.9 (L) 14.0 - 18.0 g/dL    Hematocrit 25.6 (L) 40.0 - 54.0 %    Platelets 106 (L) 150 - 350 K/uL   CBC auto differential    Collection Time: 05/11/17  4:23 AM   Result Value Ref Range    WBC 7.05 3.90 - 12.70 K/uL    Hemoglobin 8.8 (L) 14.0 - 18.0 g/dL    Hematocrit 25.1 (L) 40.0 - 54.0 %    Platelets 116 (L) 150 - 350 K/uL   CBC auto differential    Collection Time: 05/10/17  3:02 AM   Result Value Ref Range    WBC 5.31 3.90 - 12.70 K/uL    Hemoglobin 7.0 (L) 14.0 - 18.0 g/dL    Hematocrit 19.7 (LL) 40.0 - 54.0 %    Platelets 86 (L) 150 -  350 K/uL     Urinalysis  No results for input(s): COLORU, CLARITYU, SPECGRAV, PHUR, PROTEINUA, GLUCOSEU, BILIRUBINCON, BLOODU, WBCU, RBCU, BACTERIA, MUCUS, NITRITE, LEUKOCYTESUR, UROBILINOGEN, HYALINECASTS in the last 24 hours.    Diagnostic Results:  X-Ray: Reviewed  US: Reviewed  Echo: Reviewed  ACCESS    ASSESSMENT/PLAN:       ESRD  MBD  Metabolic acidosis  Anemia  Poor nutrition  Hypertension  HD tomorrow

## 2017-05-13 NOTE — PROGRESS NOTES
Events of last night noted.  Patient refusing all meds, refusing IV's, refusing dialysis, refusing dialysis access.  Patient is awake, appears lucid.  Last K was normal  No signs of fluid overload on exam @ present.  Reconsult PRN    Thank you.

## 2017-05-13 NOTE — PLAN OF CARE
Report given to Kay at [facility].  Will leave IV in place for transfer.  Tele removed.  Awaiting transportation for pt to [facility].

## 2017-05-13 NOTE — NURSING
"Patient awakened to take morning medications. Patient states that he will only take "the good medicine".  Patient swallows medication as nurse is educating him on what each one was for. Patient says that that "was not the medicine he wanted". He says again that he wishes that we would call the doctor to "give him a shot to make him go home".  "

## 2017-05-13 NOTE — OP NOTE
DATE OF PROCEDURE:  05/12/2017    PREOPERATIVE DIAGNOSES:  End-stage renal disease, status post Perm-A-Cath   placement with a transfer to hospice.    POSTOPERATIVE DIAGNOSES:  End-stage renal disease, status post Perm-A-Cath   placement with a transfer to hospice with the need for removal of Perm-A-Cath   prior to hospice transfer.    PROCEDURES DONE:  Perm-A-Cath removal.    ANESTHESIA:  Local MAC.    SURGEON:  Davin Rodriguez M.D.    SURGERY RESIDENT:  Dr. David Rivero.    BLOOD LOSS:  Minimal.    SPECIMEN:  Perm-A-Cath.    COMPLICATION:  None.  Back to the room.    HISTORY AND INDICATION:  This is a 72-year-old gentleman with a known history of   kidney problem, had acute renal failure and chronic renal insufficiency.  He   had a dialysis catheter placed and was dialysis done in the hospitalization.    The patient did not want any more procedures and he suddenly wanted to quit all   the procedures, so he was arranged for hospice transfer.  There were no other   requirement.  Hospice is to remove all the catheters.  Therefore, we got a   consult back from the Medicine Service for removal of catheter so that he could   be transferred back to hospice service.    PROCEDURE IN DETAIL:  The patient was brought to the Operating Room, was placed   in supine position, was given some sedation.  The peripheral IV was started, 1%   Xylocaine was infiltrated around the catheter.  The previous stitches were all   taken out.  Dissection was done proximal to the cuff of the catheter, and the   catheter was pulled out following which pressure was applied for 10 minutes and   then pressure dressing was applied.  The patient was stable, after placement of   the dressing was transferred back to his room in a stable condition.      SYBIL  dd: 05/12/2017 15:17:55 (CDT)  td: 05/12/2017 20:53:26 (CDT)  Doc ID   #[id number]  Job ID #[id number]    CC:

## 2017-05-15 NOTE — DISCHARGE SUMMARY
[facility] Discharge Summary    Primary Team: [facility]  Attending Physician: Dr. Frances  Resident: Craig  Intern: Wilfrido    Date of Admit: 5/8/2017  Date of Discharge: 5/13/2017    Discharge to: [facility]  Condition: Stable    Discharge Diagnoses     Patient Active Problem List   Diagnosis    Renal failure    DM (diabetes mellitus) type II controlled, neurological manifestation    BPH (benign prostatic hyperplasia)    CAD (coronary artery disease)    CKD (chronic kidney disease) stage 4, GFR 15-29 ml/min    S/P CABG (coronary artery bypass graft)    DM (diabetes mellitus), type 2 with renal complications    Essential hypertension    Coronary artery disease involving coronary bypass graft of native heart without angina pectoris    Type 2 diabetes mellitus with diabetic nephropathy    Hyperglycemia    Fall    PVD (peripheral vascular disease)    Noncompliance    Renal failure (ARF), acute on chronic    Acute hyperkalemia    Hypocalcemia    Metabolic acidosis    Vitamin D deficiency    Benign prostatic hyperplasia    Coronary artery disease involving native coronary artery without angina pectoris    End of life care    Encounter for hospice care discussion    Goals of care, counseling/discussion       Consultants and Procedures     Consultants:  Palliative Care, General Surgery, Nephrology    Procedures:   Trialysis placement, Permacath placement/removal, HD    Brief History of Present Illness      Tommie Salmeron is a 72 y.o. male who  has a past medical history of Blind left eye; BPH (benign prostatic hyperplasia); CHF (congestive heart failure); COPD (chronic obstructive pulmonary disease); Diabetes mellitus type I; Hyperlipidemia; Hypertension; Neuropathy; Prosthetic eye globe; and PVD (peripheral vascular disease).  The patient presented to [facility] on 5/8/2017 with a primary complaint of Fall (fall last night, swelling around left eye, lives in  assisted living center alone. not eating well, and not taking medication appropriately, CBG was 89, then 68, EMS gave oral glucose and D5,now over 100)    For the full HPI please refer to the History & Physical from this admission.    Hospital Course By Problem with Pertinent Findings     Acute Renal Failure in setting of CKDV (now likely ESRD)  - follows Dr. Phillip  oliguric  has discussion for dialysis with nephrologist but refuses starting HD  - presented with K 5.6, BUN/Cr 113/18.3, Phos 11.5  baseline Cr 5-7  - EKG NSR, LAE, RBBB, LVH, L anterior fasicular block   - s/p HD x 3  permacath placed 5/9   - continue sevelamer  consulted L-nephro for HD in house  - spoke extensively with patient/son/family, decision for hospice: inpatient vs home   - consulted Dr. Shelby who spoke to patient/son, will meet with family again to finalize family  - consulted [facility] for hospice placement, cancelled hospice due patient changing deicions  - Dr. Shelby/Palliative care discussed goals of care with the son/patient and family 5/13, patient has been refusing dialysis without presence of family, but agrees to dialysis with family presence.  - After meeting, patient refused to consent for another permacath placement. - Primary team spoke with patient again, both sons (Mr. Mcgowan) and Aunt Adrianne, regarding goals of care. Patient again refused dialysis and would like comfort care. Patient personally voiced his wishes to son, Aunt,  who were all in the room. He was discharged to [facility]. Only medications that would ensure comfort care were continued at discharge, as noted in meds below.        AGMA - resolved  - AG 20 on admit, trending down to normal  likely 2/2 to ARF  - as above      Hyperkalemia/Hyperphosphatemia/Hypocalcemia - improving   - K 5.6, Phos 11.5, Ca 4.8 on admit  likely 2/2 ARF  - continue sevelamer/citrate  s/p HD x2   - electrolytes improved, monitor with BMP and HD  - Patient  refused any further intervention with HD.      Vitamin D Deficiency  - Ca has been low since admit  Vit D 11  - replaced with 50K U D2  will need 6-8 weeks, then 800U D3 daily after      Hypoglycemia - resolved   - 2 episodes of BG in 30's and received D50, then BG 65 the next day  - BG controlled on this admission  holding home meds Novolin 10U BID given hypoglycemia  - continue SSI and accuchecks      Fall with mild Rhabdomyolysis - improving  - 1 episode of fall at assisted living  per family, >2hours LOC yesterday  - possibly related to hypoglycemia   - CK 1560, trended down  CT head with midline posterior parietal scalp mild soft tissue swelling/contusion near the vertex, without displaced skull fracture or acute intracranial findings identified      Asymptomatic Bacteruria/Hematuria   - UA with RBC 20, WBC 5, mod bacteria  - No abx for now  UCx NG      CAD s/p CABG  - continue ASA, simvastatin, Lopressor      Chronic Diastolic Dysfunction  - h/o combined systolic/diastolic HF   - 5/2015 ECHO with 40-45%, DD, mild MR/TR, MARILYN, possible restrictive CM  - CXR Overall grossly stable chronic findings noting possible minimal interstitial edema/congestion without convincing large focal consolidation  - ECHO on this admission EF 60%, Pulm HTN 51mmg Hg, mid TR, no wall motion abnl      Uncontrolled DMII  - A1C 6.9 on this admission   - Home meds NPH 10U BID  will hold home insulin regime due to episodes of hypoglycemia  - SSI and accuchecks for now and adjust as needed      Normocytic Anemia/Thrombocytopenia  - H/H 9.2/26 on admit  baseline Hb 9-10  Plt 105, chronically low in past  - no signs of bleeding  Fe c/w AoCD  - s/p 1U PRBC, responded appropriately       HTN  - continue clonidine, lopressor, hydralazine       HLD   - repeat lipid panel: nasir 93, HDL 17, LDL 52,   - continue simvastatin      PAD s/p PTA  - 2/2012 US LE Arterial with R mid SFA, regions in popliteal artery/anterior tibial, L  "SFA  - Continue ASA  counseled patient on smoking cessation      Depression  - continue escitalopram  - patient agitated and combative this AM  soft restraints for now, remove when improved       Discharge Medications        Medication List      START taking these medications          calcium carbonate 200 mg calcium (500 mg) chewable tablet   Commonly known as:  TUMS   Take 2 tablets (1,000 mg total) by mouth every evening.       ergocalciferol 50,000 unit Cap   Commonly known as:  ERGOCALCIFEROL   Take 1 capsule (50,000 Units total) by mouth every 7 days.       morphine 30 MG 12 hr tablet   Commonly known as:  MS CONTIN   Take 1 tablet (30 mg total) by mouth every 12 (twelve) hours.       oxycodone-acetaminophen 5-325 mg per tablet   Commonly known as:  PERCOCET   Take 1 tablet by mouth every 4 (four) hours as needed.         CONTINUE taking these medications          escitalopram oxalate 20 MG tablet   Commonly known as:  LEXAPRO   TAKE ONE TABLET BY MOUTH ONCE DAILY       furosemide 20 MG tablet   Commonly known as:  LASIX   TAKE ONE TABLET BY MOUTH ONCE DAILY       LYRICA 75 MG capsule   Generic drug:  pregabalin   TAKE ONE CAPSULE BY MOUTH NIGHTLY       pantoprazole 40 MG tablet   Commonly known as:  PROTONIX   TAKE ONE TABLET BY MOUTH ONCE DAILY       spironolactone 25 MG tablet   Commonly known as:  ALDACTONE   TAKE ONE TABLET BY MOUTH ONCE DAILY         STOP taking these medications          aspirin 81 MG EC tablet   Commonly known as:  ECOTRIN       cloNIDine 0.2 MG tablet   Commonly known as:  CATAPRES       lancets Misc       metoprolol tartrate 100 MG tablet   Commonly known as:  LOPRESSOR       NOVOLIN 70/30 100 unit/mL (70-30) injection   Generic drug:  insulin NPH-insulin regular (70/30)       pen needle, diabetic, safety 29 gauge x 1/2" Ndle   Commonly known as:  PRODIGY PEN NEEDLE       simvastatin 20 MG tablet   Commonly known as:  ZOCOR       TRUETEST TEST STRIPS MISC            Where to Get " Your Medications      Information about where to get these medications is not yet available     ! Ask your nurse or doctor about these medications     calcium carbonate 200 mg calcium (500 mg) chewable tablet    ergocalciferol 50,000 unit Cap    morphine 30 MG 12 hr tablet    oxycodone-acetaminophen 5-325 mg per tablet             Discharge Information:   Diet:  Regular    Physical Activity:  As Tolerated    Instructions:  1. Take all medications as prescribed  2. Keep all follow-up appointments    Follow-Up Appointments:  None    Luis A Almeida  [facility], [facility]

## (undated) DEVICE — PACK BASIC

## (undated) DEVICE — GLOVE SURG BIOGEL LATEX SZ 7.5

## (undated) DEVICE — MANIFOLD 4 PORT

## (undated) DEVICE — SEE MEDLINE ITEM 157117

## (undated) DEVICE — NDL HYPO A BEVEL 22X1 1/2

## (undated) DEVICE — APPLICATOR CHLORAPREP ORN 26ML

## (undated) DEVICE — SET DECANTER MEDICHOICE

## (undated) DEVICE — SPONGE DERMA 8PLY 2X2

## (undated) DEVICE — SUT ETHILON 4-0 PS2 18 BLK

## (undated) DEVICE — SYR 10CC LUER LOCK

## (undated) DEVICE — NDL HYPO REG 25G X 1 1/2

## (undated) DEVICE — DEVICE PICC SECURE SORBA VIEW

## (undated) DEVICE — GAUZE SPONGE 4X4 12PLY

## (undated) DEVICE — COVER OVERHEAD SURG LT BLUE

## (undated) DEVICE — SUT MONOCYRL 4-0 PS2 UND

## (undated) DEVICE — BOOT SUTURE AID

## (undated) DEVICE — DRESSING TRANS 4X4 3/4

## (undated) DEVICE — DRESSING TEGADERM CHG 4X4.5

## (undated) DEVICE — SUT 2-0 ETHILON 18 FS

## (undated) DEVICE — SHEET THYROID W/ISO-BAC

## (undated) DEVICE — SYR 30CC LUER LOCK

## (undated) DEVICE — SEE MEDLINE ITEM 152622

## (undated) DEVICE — COVER PROBE 6X48

## (undated) DEVICE — SUT 3-0 VICRYL / SH (J416)

## (undated) DEVICE — SEE MEDLINE ITEM 157116

## (undated) DEVICE — SEE MEDLINE ITEM 156955

## (undated) DEVICE — ELECTRODE REM PLYHSV RETURN 9

## (undated) DEVICE — DRESSING ANTIMICROBIAL 1 INCH

## (undated) DEVICE — DRAPE C-ARM/MOBILE XRAY 44X80

## (undated) DEVICE — UNDERGLOVES BIOGEL PI SIZE 8

## (undated) DEVICE — DRESSING TEGADERM 2 3/8 X 2.75

## (undated) DEVICE — GAUZE SPONGE 4'X4 12 PLY